# Patient Record
Sex: FEMALE | Race: WHITE | Employment: OTHER | URBAN - METROPOLITAN AREA
[De-identification: names, ages, dates, MRNs, and addresses within clinical notes are randomized per-mention and may not be internally consistent; named-entity substitution may affect disease eponyms.]

---

## 2017-03-15 ENCOUNTER — OP HISTORICAL/CONVERTED ENCOUNTER (OUTPATIENT)
Dept: OTHER | Age: 82
End: 2017-03-15

## 2017-08-05 ENCOUNTER — ED HISTORICAL/CONVERTED ENCOUNTER (OUTPATIENT)
Dept: OTHER | Age: 82
End: 2017-08-05

## 2017-09-27 ENCOUNTER — OP HISTORICAL/CONVERTED ENCOUNTER (OUTPATIENT)
Dept: OTHER | Age: 82
End: 2017-09-27

## 2017-12-06 ENCOUNTER — OP HISTORICAL/CONVERTED ENCOUNTER (OUTPATIENT)
Dept: OTHER | Age: 82
End: 2017-12-06

## 2018-09-12 ENCOUNTER — OP HISTORICAL/CONVERTED ENCOUNTER (OUTPATIENT)
Dept: OTHER | Age: 83
End: 2018-09-12

## 2019-02-15 ENCOUNTER — IP HISTORICAL/CONVERTED ENCOUNTER (OUTPATIENT)
Dept: OTHER | Age: 84
End: 2019-02-15

## 2019-03-09 ENCOUNTER — IP HISTORICAL/CONVERTED ENCOUNTER (OUTPATIENT)
Dept: OTHER | Age: 84
End: 2019-03-09

## 2019-04-17 ENCOUNTER — OP HISTORICAL/CONVERTED ENCOUNTER (OUTPATIENT)
Dept: OTHER | Age: 84
End: 2019-04-17

## 2019-07-17 ENCOUNTER — OP HISTORICAL/CONVERTED ENCOUNTER (OUTPATIENT)
Dept: OTHER | Age: 84
End: 2019-07-17

## 2019-11-13 ENCOUNTER — OP HISTORICAL/CONVERTED ENCOUNTER (OUTPATIENT)
Dept: OTHER | Age: 84
End: 2019-11-13

## 2020-01-30 ENCOUNTER — OP HISTORICAL/CONVERTED ENCOUNTER (OUTPATIENT)
Dept: OTHER | Age: 85
End: 2020-01-30

## 2020-04-15 ENCOUNTER — OP HISTORICAL/CONVERTED ENCOUNTER (OUTPATIENT)
Dept: OTHER | Age: 85
End: 2020-04-15

## 2020-08-17 ENCOUNTER — ED HISTORICAL/CONVERTED ENCOUNTER (OUTPATIENT)
Dept: OTHER | Age: 85
End: 2020-08-17

## 2020-12-07 ENCOUNTER — HOSPITAL ENCOUNTER (OUTPATIENT)
Dept: LAB | Age: 85
Discharge: HOME | End: 2020-12-07
Payer: MEDICARE

## 2020-12-07 PROCEDURE — 87086 URINE CULTURE/COLONY COUNT: CPT

## 2020-12-07 PROCEDURE — 81001 URINALYSIS AUTO W/SCOPE: CPT

## 2020-12-08 LAB
APPEARANCE UR: ABNORMAL
BACTERIA URNS QL MICRO: ABNORMAL /HPF
BILIRUB UR QL: NEGATIVE
COLOR UR: YELLOW
GLUCOSE UR STRIP.AUTO-MCNC: NEGATIVE MG/DL
HGB UR QL STRIP: NEGATIVE
KETONES UR QL STRIP.AUTO: NEGATIVE MG/DL
LEUKOCYTE ESTERASE UR QL STRIP.AUTO: ABNORMAL
NITRITE UR QL STRIP.AUTO: POSITIVE
PH UR STRIP: 5 [PH] (ref 5–8)
PROT UR STRIP-MCNC: NEGATIVE MG/DL
RBC #/AREA URNS HPF: ABNORMAL /HPF (ref 0–5)
SP GR UR REFRACTOMETRY: 1.01 (ref 1–1.03)
UROBILINOGEN UR QL STRIP.AUTO: 0.1 EU/DL (ref 0.1–1)
WBC URNS QL MICRO: >100 /HPF (ref 0–4)

## 2020-12-11 LAB
BACTERIA SPEC CULT: NORMAL
COLONY COUNT,CNT: NORMAL
SPECIAL REQUESTS,SREQ: NORMAL

## 2021-02-10 ENCOUNTER — HOSPITAL ENCOUNTER (OUTPATIENT)
Dept: LAB | Age: 86
Discharge: HOME | End: 2021-02-10
Payer: MEDICARE

## 2021-02-10 LAB
ALBUMIN SERPL-MCNC: 3.2 G/DL (ref 3.5–5)
ALBUMIN/GLOB SERPL: 0.9 {RATIO} (ref 1.1–2.2)
ALP SERPL-CCNC: 104 U/L (ref 45–117)
ALT SERPL-CCNC: 17 U/L (ref 12–78)
ANION GAP SERPL CALC-SCNC: 4 MMOL/L (ref 5–15)
AST SERPL W P-5'-P-CCNC: 19 U/L (ref 15–37)
BASOPHILS # BLD: 0 K/UL (ref 0–0.1)
BASOPHILS NFR BLD: 1 % (ref 0–1)
BILIRUB SERPL-MCNC: 0.7 MG/DL (ref 0.2–1)
BUN SERPL-MCNC: 8 MG/DL (ref 6–20)
BUN/CREAT SERPL: 9 (ref 12–20)
CA-I BLD-MCNC: 8.7 MG/DL (ref 8.5–10.1)
CHLORIDE SERPL-SCNC: 105 MMOL/L (ref 97–108)
CHOLEST SERPL-MCNC: 120 MG/DL
CO2 SERPL-SCNC: 30 MMOL/L (ref 21–32)
CREAT SERPL-MCNC: 0.93 MG/DL (ref 0.55–1.02)
DIFFERENTIAL METHOD BLD: ABNORMAL
EOSINOPHIL # BLD: 0.3 K/UL (ref 0–0.4)
EOSINOPHIL NFR BLD: 4 % (ref 0–7)
ERYTHROCYTE [DISTWIDTH] IN BLOOD BY AUTOMATED COUNT: 14.6 % (ref 11.5–14.5)
GLOBULIN SER CALC-MCNC: 3.4 G/DL (ref 2–4)
GLUCOSE SERPL-MCNC: 77 MG/DL (ref 65–100)
HCT VFR BLD AUTO: 37.4 % (ref 35–47)
HDLC SERPL-MCNC: 43 MG/DL
HDLC SERPL: 2.8 {RATIO} (ref 0–5)
HGB BLD-MCNC: 11.5 G/DL (ref 11.5–16)
IMM GRANULOCYTES # BLD AUTO: 0 K/UL (ref 0–0.04)
IMM GRANULOCYTES NFR BLD AUTO: 0 % (ref 0–0.5)
LDLC SERPL CALC-MCNC: 58.8 MG/DL (ref 0–100)
LIPID PROFILE,FLP: NORMAL
LYMPHOCYTES # BLD: 2.2 K/UL (ref 0.8–3.5)
LYMPHOCYTES NFR BLD: 29 % (ref 12–49)
MCH RBC QN AUTO: 30.9 PG (ref 26–34)
MCHC RBC AUTO-ENTMCNC: 30.7 G/DL (ref 30–36.5)
MCV RBC AUTO: 100.5 FL (ref 80–99)
MONOCYTES # BLD: 0.8 K/UL (ref 0–1)
MONOCYTES NFR BLD: 11 % (ref 5–13)
NEUTS SEG # BLD: 4.2 K/UL (ref 1.8–8)
NEUTS SEG NFR BLD: 55 % (ref 32–75)
PLATELET # BLD AUTO: 246 K/UL (ref 150–400)
PMV BLD AUTO: 9.7 FL (ref 8.9–12.9)
POTASSIUM SERPL-SCNC: 4.6 MMOL/L (ref 3.5–5.1)
PROT SERPL-MCNC: 6.6 G/DL (ref 6.4–8.2)
RBC # BLD AUTO: 3.72 M/UL (ref 3.8–5.2)
SODIUM SERPL-SCNC: 139 MMOL/L (ref 136–145)
TRIGL SERPL-MCNC: 91 MG/DL (ref ?–150)
VLDLC SERPL CALC-MCNC: 18.2 MG/DL
WBC # BLD AUTO: 7.5 K/UL (ref 3.6–11)

## 2021-02-10 PROCEDURE — 80053 COMPREHEN METABOLIC PANEL: CPT

## 2021-02-10 PROCEDURE — 80061 LIPID PANEL: CPT

## 2021-02-10 PROCEDURE — 85025 COMPLETE CBC W/AUTO DIFF WBC: CPT

## 2021-04-11 ENCOUNTER — HOSPITAL ENCOUNTER (INPATIENT)
Age: 86
LOS: 8 days | Discharge: HOME HEALTH | DRG: 478 | End: 2021-04-19
Attending: EMERGENCY MEDICINE | Admitting: INTERNAL MEDICINE
Payer: MEDICARE

## 2021-04-11 ENCOUNTER — APPOINTMENT (OUTPATIENT)
Dept: GENERAL RADIOLOGY | Age: 86
DRG: 478 | End: 2021-04-11
Attending: EMERGENCY MEDICINE
Payer: MEDICARE

## 2021-04-11 ENCOUNTER — APPOINTMENT (OUTPATIENT)
Dept: CT IMAGING | Age: 86
DRG: 478 | End: 2021-04-11
Attending: EMERGENCY MEDICINE
Payer: MEDICARE

## 2021-04-11 DIAGNOSIS — M43.9 COMPRESSION DEFORMITY OF VERTEBRA: Primary | ICD-10-CM

## 2021-04-11 PROBLEM — S22.000A COMPRESSION FRACTURE OF BODY OF THORACIC VERTEBRA (HCC): Status: ACTIVE | Noted: 2021-04-11

## 2021-04-11 LAB
COVID-19 RAPID TEST, COVR: NOT DETECTED
SPECIMEN SOURCE: NORMAL

## 2021-04-11 PROCEDURE — 71045 X-RAY EXAM CHEST 1 VIEW: CPT

## 2021-04-11 PROCEDURE — 87635 SARS-COV-2 COVID-19 AMP PRB: CPT

## 2021-04-11 PROCEDURE — 74011250636 HC RX REV CODE- 250/636: Performed by: INTERNAL MEDICINE

## 2021-04-11 PROCEDURE — 72131 CT LUMBAR SPINE W/O DYE: CPT

## 2021-04-11 PROCEDURE — 65270000032 HC RM SEMIPRIVATE

## 2021-04-11 PROCEDURE — 74011250637 HC RX REV CODE- 250/637: Performed by: EMERGENCY MEDICINE

## 2021-04-11 PROCEDURE — 99285 EMERGENCY DEPT VISIT HI MDM: CPT

## 2021-04-11 PROCEDURE — 74011250637 HC RX REV CODE- 250/637: Performed by: PHYSICIAN ASSISTANT

## 2021-04-11 PROCEDURE — 70450 CT HEAD/BRAIN W/O DYE: CPT

## 2021-04-11 PROCEDURE — 72128 CT CHEST SPINE W/O DYE: CPT

## 2021-04-11 RX ORDER — CHOLECALCIFEROL TAB 125 MCG (5000 UNIT) 125 MCG
5000 TAB ORAL DAILY
Status: DISCONTINUED | OUTPATIENT
Start: 2021-04-12 | End: 2021-04-19 | Stop reason: HOSPADM

## 2021-04-11 RX ORDER — CARVEDILOL 3.12 MG/1
3.12 TABLET ORAL 2 TIMES DAILY
Status: DISCONTINUED | OUTPATIENT
Start: 2021-04-11 | End: 2021-04-19 | Stop reason: HOSPADM

## 2021-04-11 RX ORDER — CARVEDILOL 3.12 MG/1
3.12 TABLET ORAL 2 TIMES DAILY
COMMUNITY

## 2021-04-11 RX ORDER — ONDANSETRON 4 MG/1
4 TABLET, ORALLY DISINTEGRATING ORAL
Status: DISCONTINUED | OUTPATIENT
Start: 2021-04-11 | End: 2021-04-19 | Stop reason: HOSPADM

## 2021-04-11 RX ORDER — FAMOTIDINE 20 MG/1
20 TABLET, FILM COATED ORAL 2 TIMES DAILY
Status: DISCONTINUED | OUTPATIENT
Start: 2021-04-11 | End: 2021-04-19 | Stop reason: HOSPADM

## 2021-04-11 RX ORDER — SPIRONOLACTONE 25 MG/1
25 TABLET ORAL DAILY
Status: DISCONTINUED | OUTPATIENT
Start: 2021-04-12 | End: 2021-04-19 | Stop reason: HOSPADM

## 2021-04-11 RX ORDER — ACETAMINOPHEN 325 MG/1
650 TABLET ORAL ONCE
Status: COMPLETED | OUTPATIENT
Start: 2021-04-11 | End: 2021-04-11

## 2021-04-11 RX ORDER — ASPIRIN 81 MG/1
81 TABLET ORAL DAILY
COMMUNITY

## 2021-04-11 RX ORDER — GABAPENTIN 100 MG/1
100 CAPSULE ORAL DAILY
COMMUNITY

## 2021-04-11 RX ORDER — ACETAMINOPHEN 650 MG/1
650 SUPPOSITORY RECTAL
Status: DISCONTINUED | OUTPATIENT
Start: 2021-04-11 | End: 2021-04-19 | Stop reason: HOSPADM

## 2021-04-11 RX ORDER — ATORVASTATIN CALCIUM 10 MG/1
10 TABLET, FILM COATED ORAL DAILY
Status: DISCONTINUED | OUTPATIENT
Start: 2021-04-12 | End: 2021-04-19 | Stop reason: HOSPADM

## 2021-04-11 RX ORDER — BUSPIRONE HYDROCHLORIDE 10 MG/1
10 TABLET ORAL 2 TIMES DAILY
COMMUNITY

## 2021-04-11 RX ORDER — CLOPIDOGREL BISULFATE 75 MG/1
75 TABLET ORAL DAILY
COMMUNITY

## 2021-04-11 RX ORDER — MIRTAZAPINE 7.5 MG/1
7.5 TABLET, FILM COATED ORAL
COMMUNITY

## 2021-04-11 RX ORDER — ASPIRIN 325 MG
325 TABLET ORAL
Status: DISCONTINUED | OUTPATIENT
Start: 2021-04-11 | End: 2021-04-11

## 2021-04-11 RX ORDER — SODIUM CHLORIDE 0.9 % (FLUSH) 0.9 %
5-40 SYRINGE (ML) INJECTION EVERY 8 HOURS
Status: DISCONTINUED | OUTPATIENT
Start: 2021-04-11 | End: 2021-04-17

## 2021-04-11 RX ORDER — ACETAMINOPHEN 325 MG/1
650 TABLET ORAL
COMMUNITY

## 2021-04-11 RX ORDER — AMOXICILLIN 250 MG
1 CAPSULE ORAL
Status: DISCONTINUED | OUTPATIENT
Start: 2021-04-11 | End: 2021-04-19 | Stop reason: HOSPADM

## 2021-04-11 RX ORDER — GABAPENTIN 100 MG/1
100 CAPSULE ORAL DAILY
Status: DISCONTINUED | OUTPATIENT
Start: 2021-04-12 | End: 2021-04-19 | Stop reason: HOSPADM

## 2021-04-11 RX ORDER — ONDANSETRON 2 MG/ML
4 INJECTION INTRAMUSCULAR; INTRAVENOUS
Status: DISCONTINUED | OUTPATIENT
Start: 2021-04-11 | End: 2021-04-19 | Stop reason: HOSPADM

## 2021-04-11 RX ORDER — POLYETHYLENE GLYCOL 3350 17 G/17G
17 POWDER, FOR SOLUTION ORAL DAILY PRN
Status: DISCONTINUED | OUTPATIENT
Start: 2021-04-11 | End: 2021-04-19 | Stop reason: HOSPADM

## 2021-04-11 RX ORDER — MORPHINE SULFATE 2 MG/ML
1 INJECTION, SOLUTION INTRAMUSCULAR; INTRAVENOUS
Status: DISPENSED | OUTPATIENT
Start: 2021-04-11 | End: 2021-04-13

## 2021-04-11 RX ORDER — SODIUM CHLORIDE 0.9 % (FLUSH) 0.9 %
5-40 SYRINGE (ML) INJECTION AS NEEDED
Status: DISCONTINUED | OUTPATIENT
Start: 2021-04-11 | End: 2021-04-19 | Stop reason: HOSPADM

## 2021-04-11 RX ORDER — ATORVASTATIN CALCIUM 10 MG/1
10 TABLET, FILM COATED ORAL DAILY
COMMUNITY

## 2021-04-11 RX ORDER — ACETAMINOPHEN 325 MG/1
650 TABLET ORAL
Status: DISCONTINUED | OUTPATIENT
Start: 2021-04-11 | End: 2021-04-19 | Stop reason: HOSPADM

## 2021-04-11 RX ORDER — BUSPIRONE HYDROCHLORIDE 10 MG/1
10 TABLET ORAL 2 TIMES DAILY
Status: DISCONTINUED | OUTPATIENT
Start: 2021-04-11 | End: 2021-04-19 | Stop reason: HOSPADM

## 2021-04-11 RX ORDER — TRAMADOL HYDROCHLORIDE 50 MG/1
50 TABLET ORAL
Status: DISCONTINUED | OUTPATIENT
Start: 2021-04-11 | End: 2021-04-19 | Stop reason: HOSPADM

## 2021-04-11 RX ORDER — POLYETHYLENE GLYCOL 3350 17 G/17G
17 POWDER, FOR SOLUTION ORAL
COMMUNITY

## 2021-04-11 RX ORDER — SPIRONOLACTONE 25 MG/1
25 TABLET ORAL DAILY
COMMUNITY

## 2021-04-11 RX ORDER — MIRTAZAPINE 15 MG/1
7.5 TABLET, FILM COATED ORAL
Status: DISCONTINUED | OUTPATIENT
Start: 2021-04-11 | End: 2021-04-19 | Stop reason: HOSPADM

## 2021-04-11 RX ORDER — CHOLECALCIFEROL TAB 125 MCG (5000 UNIT) 125 MCG
5000 TAB ORAL DAILY
COMMUNITY

## 2021-04-11 RX ADMIN — ACETAMINOPHEN 650 MG: 325 TABLET, FILM COATED ORAL at 09:33

## 2021-04-11 RX ADMIN — MIRTAZAPINE 7.5 MG: 15 TABLET, FILM COATED ORAL at 21:19

## 2021-04-11 RX ADMIN — MORPHINE SULFATE 1 MG: 2 INJECTION, SOLUTION INTRAMUSCULAR; INTRAVENOUS at 14:02

## 2021-04-11 RX ADMIN — ACETAMINOPHEN 650 MG: 325 TABLET, FILM COATED ORAL at 21:21

## 2021-04-11 RX ADMIN — BUSPIRONE HYDROCHLORIDE 10 MG: 10 TABLET ORAL at 21:19

## 2021-04-11 RX ADMIN — FAMOTIDINE 20 MG: 20 TABLET ORAL at 21:19

## 2021-04-11 RX ADMIN — Medication 10 ML: at 21:21

## 2021-04-11 RX ADMIN — CARVEDILOL 3.12 MG: 3.12 TABLET, FILM COATED ORAL at 21:19

## 2021-04-11 RX ADMIN — SENNOSIDES AND DOCUSATE SODIUM 1 TABLET: 8.6; 5 TABLET ORAL at 21:19

## 2021-04-11 RX ADMIN — Medication 10 ML: at 14:26

## 2021-04-11 NOTE — PROGRESS NOTES
Reason for Admission:                       RUR Score:                  PCP: First and Last name:   Lynda Garcia NP     Name of Practice:    Are you a current patient: Yes/No:    Approximate date of last visit:    Can you participate in a virtual visit if needed:     Do you (patient/family) have any concerns for transition/discharge?                    Plan for utilizing home health:       Current Advanced Directive/Advance Care Plan:  DNR      Healthcare Decision Maker:   Click here to complete [address] including selection of the Healthcare Decision Maker Relationship (ie \"Primary\")              Transition of Care Plan:        [facility]  Will follow ortho and thearapy evaluation/recommendation

## 2021-04-11 NOTE — ED PROVIDER NOTES
EMERGENCY DEPARTMENT HISTORY AND PHYSICAL EXAM        Date: 4/11/2021  Patient Name: Kezia Pacheco    History of Presenting Illness     Chief Complaint   Patient presents with    Back Pain     fractures of the thorco/lumbar spine compression fractures L4 and T12 from [facility]       History Provided By: Patient    HPI: Kezia Pacheco, 80 y.o. female with history of dementia who presents with vague pain in her left side. Unclear when this pain started however it is noted that patient has been less ambulatory and less active at her home. She is a poor historian due to dementia. No known falls. No other known injuries. Over the last few days she has been having decreased activity and complaining of pain on the left side. Pain is in the left rib cage and low back region. Unable to obtain any further history due to patient dementia. Unknown other associated symptoms. PCP: Yoav Prado NP        Past History     Past Medical History:  Dementia    Past Surgical History:  Reviewed and noncontributory    Family History:  History reviewed. No pertinent family history. Social History:  Social History     Tobacco Use    Smoking status: Unknown If Ever Smoked   Substance Use Topics    Alcohol use: Not Currently    Drug use: Not on file       Allergies: Allergies   Allergen Reactions    Iodine Unknown (comments)    Seafood Unable to Obtain     Review of Systems   Review of Systems   Constitutional: Negative for fever. HENT: Negative for congestion. Eyes: Negative for visual disturbance. Respiratory: Negative for shortness of breath. Cardiovascular: Negative for chest pain. Gastrointestinal: Negative for abdominal pain. Genitourinary: Negative for dysuria. Musculoskeletal: Positive for back pain. Negative for arthralgias. Skin: Negative for rash. Neurological: Negative for headaches. Physical Exam   Constitutional: No acute distress. Well-nourished. Skin: No rash.   ENT: No rhinorrhea. No cough. Head is normocephalic and atraumatic. Eye: No proptosis or conjunctival injections. Respiratory: No apparent respiratory distress. Gastrointestinal: Nondistended. Musculoskeletal: No obvious bony deformities. Tenderness to the left anterior rib cage. No tenderness to the lumbar spine or thoracic spine. Psychiatric: Cooperative. Appropriate mood and affect. Diagnostic Study Results     Labs -   No results found for this or any previous visit (from the past 24 hour(s)). Radiologic Studies -   CT HEAD WO CONT   Final Result   No acute abnormality demonstrated. Findings of cortical atrophy and   chronic small vessel disease are again noted. CT SPINE LUMB WO CONT   Final Result   1. Chronic compression fractures of L2 and L5.   2. Compression fractures of L3 and L4, age indeterminate, but new from 1. 3. Posterior vertebral body cortical buckling at the L4 level results in severe   spinal canal narrowing in conjunction with degenerative change at this level. 4. Diffuse osseous demineralization with sacral alar insufficiency fractures   difficult to exclude. 5. Multilevel neuroforaminal narrowing, as detailed above. 6. See above for additional comments. CT SPINE [facility] WO CONT   Final Result   1. Multiple compression fractures within the thoracic spine, as detailed above. The compression fractures at T4, T7, and T8 appear chronic. The remainder   age-indeterminate but new from 2015.   2. T8 and T12 compression fractures have buckling of the posterior vertebral   body cortex which results in up to at least mild to moderate spinal canal   narrowing. 3. Multilevel neural foraminal narrowing within the thoracic spine, detailed   above. 4. Approximately 4 mm incidental right upper lobe solid nodule. Based on risk   factors for pulmonary malignancy, consider repeat chest CT in 12 months.       XR CHEST SNGL V   Final Result   No findings of acute cardiopulmonary abnormality. MRI [address] SPINE WO CONT    (Results Pending)   MRI LUMB SPINE WO CONT    (Results Pending)     CT Results  (Last 48 hours)               04/11/21 0935  CT HEAD WO CONT Final result    Impression:  No acute abnormality demonstrated. Findings of cortical atrophy and   chronic small vessel disease are again noted. Narrative:  Dose Reduction:        All CT scans at this facility are performed using dose reduction optimization   techniques as appropriate to a performed exam including the following: Automated   exposure control, adjustments of the mA and/or kV according to patient size, or   use of iterative reconstruction technique. CT of the head without contrast. Axial images of the head were obtained   unenhanced and sagittal and coronal reconstructions were created. Comparison to   prior exam dated 03/08/2019. Previously noted changes of cortical atrophy are   again identified and white matter changes consistent with chronic small vessel   disease are again noted. Ventricles are normal in size shape and position. No   shift of the midline or mass is seen. No pathologic extra-axial fluid collection   is identified. There is no evidence of acute hemorrhage or infarction. No bony   abnormality is seen. The exam is otherwise unremarkable. 04/11/21 0935  CT SPINE LUMB WO CONT Final result    Impression:  1. Chronic compression fractures of L2 and L5.   2. Compression fractures of L3 and L4, age indeterminate, but new from 1. 3. Posterior vertebral body cortical buckling at the L4 level results in severe   spinal canal narrowing in conjunction with degenerative change at this level. 4. Diffuse osseous demineralization with sacral alar insufficiency fractures   difficult to exclude. 5. Multilevel neuroforaminal narrowing, as detailed above. 6. See above for additional comments.        Narrative:  Exam: CT SPINE LUMB WO CONT       TECHNIQUE: Multiple transaxial CT images of the lumbar spine were obtained   without contrast. Coronal and sagittal reformatted images were provided. Dose reduction: All CT scans at this facility are performed using dose reduction   optimization techniques as appropriate to a performed exam including the   following: Automated exposure control, adjustments of the mA and/or kV according   to patient size, or use of iterative reconstruction technique. COMPARISON: Lumbar spine radiographs 6/19/2015       HISTORY: Compression fracture       FINDINGS:       5 nonrib-bearing lumbar-type vertebral bodies are present. There are chronic   compression fractures of L2 and L5. The degree of L2 height loss appears mildly   progressed. There are additional compression fractures of L4 and L3 which are   age-indeterminate, but new from 2015. Compression fracture at L4 has posterior   vertebral body cortical buckling into the spinal canal which together with   degenerative change at this level results in severe spinal canal narrowing. There is pronounced diffuse osseous demineralization. Question irregular   appearance of the sacral ala, especially on the right, which is suspicious for   an additional sacral insufficiency fracture. Multilevel degenerative lumbar spondylosis that is most pronounced in the lower   lumbar spine. Neuroforaminal narrowing is most significant and up to at least   moderate at the L4-5 level. There is also moderate neuroforaminal narrowing on   the right at L3-4. Other neuroforaminal narrowing within the lumbar spine   appears less pronounced. There is extensive atherosclerotic disease of the abdominal aorta and its major   branches. Favored extrarenal pelvis on the right without caliectasis or   hydroureter evident. Nonobstructing stone within the left renal collecting   system measuring up to approximately 4 mm.           04/11/21 0935  CT SPINE [facility] WO CONT Final result    Impression:  1.  Multiple compression fractures within the thoracic spine, as detailed above. The compression fractures at T4, T7, and T8 appear chronic. The remainder   age-indeterminate but new from 2015.   2. T8 and T12 compression fractures have buckling of the posterior vertebral   body cortex which results in up to at least mild to moderate spinal canal   narrowing. 3. Multilevel neural foraminal narrowing within the thoracic spine, detailed   above. 4. Approximately 4 mm incidental right upper lobe solid nodule. Based on risk   factors for pulmonary malignancy, consider repeat chest CT in 12 months. Narrative:  Exam: CT SPINE THORAC WO CONT       TECHNIQUE: Multiple transaxial CT images of the thoracic spine were obtained   without contrast. Coronal and sagittal reformatted images were provided. Dose reduction: All CT scans at this facility are performed using dose reduction   optimization techniques as appropriate to a performed exam including the   following: Automated exposure control, adjustments of the mA and/or kV according   to patient size, or use of iterative reconstruction technique. COMPARISON: Thoracic spine radiographs 6/19/2015       HISTORY: compression fracture       FINDINGS:       Compression fractures of T4, T6, T7, T8, T9, and T12. Concave endplate   deformities at T10 and T11 which may represent additional compression fractures. The compression deformities at T4, T7, and T8 appear chronic. The remainder are   age indeterminate, but new from 1. The compression fractures at T8 and T12   have posterior vertebral body cortical buckling which results in at least mild   to moderate spinal canal narrowing at these levels. Likely remote posterior left   eighth rib fracture at the costovertebral junction. There is neuroforaminal   narrowing at multiple levels within the thoracic spine, most pronounced and   severe on the left at the T9-10 level as well as on the right at T8-9.  There are   other levels of significant neuroforaminal narrowing involving the T8-9 through   T10-11 levels. There is also significant neuroforaminal narrowing on the left at   the T12-L1 level. Extensive atherosclerosis of the thoracic aorta and coronary vasculature. Favored dependent atelectasis within both lungs. Respiratory motion otherwise   precludes detailed evaluation of the pulmonary parenchyma. Approximately 4 mm   right upper lobe solid nodule. Atherosclerotic calcifications within the upper   abdominal soft tissues. CXR Results  (Last 48 hours)               04/11/21 0925  XR CHEST SNGL V Final result    Impression:  No findings of acute cardiopulmonary abnormality. Narrative:  Examination: XR CHEST SNGL V        History: fall       Comparison: Chest radiograph 2/18/2019       FINDINGS:       Single frontal portable view of the chest. Symmetric lung volumes without focal   airspace consolidation, pneumothorax, or significant pleural effusion. Favored   punctate calcified granulomas within the right lung. The thoracic aorta is   tortuous and prominent with atherosclerotic calcification, similar to prior. The   cardiac silhouette is enlarged, also similar to prior. Fixation hardware of the   proximal left humerus. Osseous structures are overall unchanged. Medical Decision Making and ED Course     I reviewed the available vital signs, nursing notes, past medical history, past surgical history, family history, and social history. Vital Signs - Reviewed the patient's vital signs. Patient Vitals for the past 12 hrs:   Temp Pulse Resp BP SpO2   04/11/21 1331  66 20 104/69 98 %   04/11/21 1140  (!) 58 20 (!) 160/52 98 %   04/11/21 0855 97.9 °F (36.6 °C) 68 14 (!) 150/50 98 %         Medical Decision Making:   Presented with back pain. The differential diagnosis is back strain, rib fracture, lumbar fracture, thoracic fracture, compression fracture, strain.   CT shows multiple lumbar fractures that appear to be compression fractures. She is in pain especially with movement. She does have tenderness to the lower portion of her lumbar spine and sacral region on reassessment. Originally I did not localize pain in her back however now I am convinced she does have acute fracture. Case discussed with hospitalist who will admit. I also spoke with the orthopedic doctor who will consult. Disposition     Admitted      Diagnosis     Clinical impression:   1. Compression deformity of vertebra           Attestation:  Please note that this dictation was completed with [employer], the computer voice recognition software. Quite often unanticipated grammatical, syntax, homophones, and other interpretive errors are inadvertently transcribed by the computer software. Please disregard these errors. Please excuse any errors that have escaped final proofreading. Thank you.   Napoleon Kramer, DO

## 2021-04-11 NOTE — CONSULTS
Consult Date: 4/11/2021    IP CONSULT TO ORTHOPEDIC SURGERY  Consult performed by: John De León MD  Consult ordered by: Gayatri Garcia DO  Reason for consult: Multiple spine osteoporotic fractures  Assessment/Recommendations: Assessment  80year-old female who has been complaining of for back pain in the nursing home environment. Patient does have memory loss issues. Patient had stopped walking secondary to that and had x-rays done which showed some compression fractures. Patient came to the ER had CT scan of lumbar thoracic spine which showed old compression fractures with some fractures of indeterminate age. Patient's maximal tenderness is in the lower thoracic and upper lumbar spine. Lower extremities are neurologically intact. CT scan shows fractures to be stable with no evident involvement of spinal canal.  MRI of the lumbar and thoracic spine has been ordered to find the acuteness of the fractures. After that patient will be followed up by the spine team.    Plan:  MRI evaluation of the lumbar thoracic spine  Patient can weight-bear as tolerated with therapy. Osteoporosis work-up is rather late at age 80 but still some treatment can be done if the pain or recurrence of these fractures continues with help of a endocrinologist.  No acute orthopedic intervention is planned. Once MRI is done spine team will for their input and they can decide with the patient about options of kyphoplasty. Patient will be entered Dr. Yan murphy for Spine issues. Subjective   She is a 80 y.o. female who presented to the emergency room on 4/11/2021 from [address] for vague pain in her left side. Patient has a history of congestive heart failure, dementia, hypertension, anxiety, constipation. Patient is a poor historian due to the dementia. Most of the information is gathered through medical staff and chart. Per [address] patient has not had any recent falls or injuries.   However over the last few days she has had decreased activity and complaining of left-sided pain. She denies any chest pain, shortness of breath, cough. CT of the head showed no acute abnormality but cortical atrophy with chronic small vessel disease. CT of the lumbar spine showed chronic compression fractures of L2 and L5, compression fractures of L3 and L4 age-indeterminate. Posterior vertebral body cortical buckling at the L4 level with severe spinal canal narrowing. CT of the thoracic spine showed multiple compression fractures at T4, T7, T8 which appear chronic. Past Medical History:   Diagnosis Date    Dementia ([facility] [address]     Gastrointestinal disorder     constipation    Heart failure ([facility] [address]     Hypertension     Psychiatric disorder     anxiety      History reviewed. No pertinent surgical history. History reviewed. No pertinent family history.    Social History     Tobacco Use    Smoking status: Unknown If Ever Smoked   Substance Use Topics    Alcohol use: Not Currently       Current Facility-Administered Medications   Medication Dose Route Frequency Provider Last Rate Last Admin    [START ON 4/12/2021] atorvastatin (LIPITOR) tablet 10 mg  10 mg Oral DAILY Fauzia Taylor PA-C        busPIRone (BUSPAR) tablet 10 mg  10 mg Oral BID Fauzia Taylor PA-C        carvediloL (COREG) tablet 3.125 mg  3.125 mg Oral BID Rozina Moore PA-C        [START ON 4/12/2021] cholecalciferol (VITAMIN D3) (5000 Units/125 mcg) tablet 5,000 Units  5,000 Units Oral DAILY Fauzia Taylor PA-C        [START ON 4/12/2021] gabapentin (NEURONTIN) capsule 100 mg  100 mg Oral DAILY Fauzia Taylor PA-C        mirtazapine (REMERON) tablet 7.5 mg  7.5 mg Oral QHS Fauzia Taylor PA-C        senna-docusate (PERICOLACE) 8.6-50 mg per tablet 1 Tab  1 Tab Oral QHS Fauzia Taylor PA-C        [START ON 4/12/2021] spironolactone (ALDACTONE) tablet 25 mg  25 mg Oral DAILY Fauzia Taylor PA-C        sodium chloride (NS) flush 5-40 mL  5-40 mL IntraVENous Q8H Fauzia Taylor PA-C   10 mL at 04/11/21 1426    sodium chloride (NS) flush 5-40 mL  5-40 mL IntraVENous PRN Fauzia Taylor PA-C        acetaminophen (TYLENOL) tablet 650 mg  650 mg Oral Q6H PRN Fauzia Taylor PA-C        Or    acetaminophen (TYLENOL) suppository 650 mg  650 mg Rectal Q6H PRN Fauzia Taylor PA-C        ondansetron (ZOFRAN ODT) tablet 4 mg  4 mg Oral Q8H PRN aFuzia Taylor PA-C        Or    ondansetron (ZOFRAN) injection 4 mg  4 mg IntraVENous Q6H PRN Fauzia Taylor PA-C        famotidine (PEPCID) tablet 20 mg  20 mg Oral BID Fauzia Taylor PA-C        polyethylene glycol (MIRALAX) packet 17 g  17 g Oral DAILY PRN Fauzia Taylor PA-C        morphine injection 1 mg  1 mg IntraVENous Q4H PRN Carlos Nelson MD   1 mg at 04/11/21 1402    traMADoL (ULTRAM) tablet 50 mg  50 mg Oral Q6H PRN Bella Cuevas PA-C         Current Outpatient Medications   Medication Sig Dispense Refill    aspirin delayed-release 81 mg tablet Take 81 mg by mouth daily.  busPIRone (BUSPAR) 10 mg tablet Take 10 mg by mouth two (2) times a day.  carvediloL (COREG) 3.125 mg tablet Take 3.125 mg by mouth two (2) times a day.  clopidogreL (PLAVIX) 75 mg tab Take 75 mg by mouth daily.  gabapentin (NEURONTIN) 100 mg capsule Take 100 mg by mouth daily.  sennosides/docusate sodium (SENNA PLUS PO) Take 1 Tab by mouth two (2) times a day.  spironolactone (ALDACTONE) 25 mg tablet Take 25 mg by mouth daily.  cholecalciferol (VITAMIN D3) (5000 Units/125 mcg) tab tablet Take 5,000 Units by mouth daily.  atorvastatin (LIPITOR) 10 mg tablet Take 10 mg by mouth daily.  mirtazapine (REMERON) 7.5 mg tablet Take 7.5 mg by mouth nightly.  acetaminophen (TYLENOL) 325 mg tablet Take 650 mg by mouth every six (6) hours as needed for Pain.  polyethylene glycol (MIRALAX) 17 gram/dose powder Take 17 g by mouth daily as needed for Constipation. Indications: constipation          Review of Systems  Constitutional: No fevers, No chills, No fatigue, No weakness  Eyes: No visual disturbance  Ears, Nose, Mouth, Throat, and Face: No nasal congestion, No sore throat  Respiratory: No cough, No sputum, No wheezing, No SOB  Cardiovascular: No chest pain, No lower extremity edema, No Palpitations   Gastrointestinal: No nausea, No vomiting, No diarrhea, No constipation, No abdominal pain  Genitourinary: No frequency, No dysuria, No hematuria  Integument/Breast: No rash, No skin lesion(s), No dryness  Musculoskeletal: No arthralgias, No neck pain, No back pain  Neurological: No headaches, No dizziness, No confusion,  No seizures  Behavioral/Psychiatric: No anxiety, No depression  Objective     Vital signs for last 24 hours:  Visit Vitals  BP (!) 126/101   Pulse 83   Temp 97.9 °F (36.6 °C)   Resp 16   SpO2 96%       Intake/Output this shift:  Current Shift: No intake/output data recorded. Last 3 Shifts: No intake/output data recorded. Data Review:   Recent Results (from the past 24 hour(s))   COVID-19 RAPID TEST    Collection Time: 04/11/21  2:42 PM   Result Value Ref Range    Specimen source Nasopharyngeal      COVID-19 rapid test Not Detected Not Detected       CT Results  (Last 48 hours)               04/11/21 0935  CT HEAD WO CONT Final result    Impression:  No acute abnormality demonstrated. Findings of cortical atrophy and   chronic small vessel disease are again noted. Narrative:  Dose Reduction:        All CT scans at this facility are performed using dose reduction optimization   techniques as appropriate to a performed exam including the following: Automated   exposure control, adjustments of the mA and/or kV according to patient size, or   use of iterative reconstruction technique. CT of the head without contrast. Axial images of the head were obtained   unenhanced and sagittal and coronal reconstructions were created.  Comparison to   prior exam dated 03/08/2019. Previously noted changes of cortical atrophy are   again identified and white matter changes consistent with chronic small vessel   disease are again noted. Ventricles are normal in size shape and position. No   shift of the midline or mass is seen. No pathologic extra-axial fluid collection   is identified. There is no evidence of acute hemorrhage or infarction. No bony   abnormality is seen. The exam is otherwise unremarkable. 04/11/21 0935  CT SPINE LUMB WO CONT Final result    Impression:  1. Chronic compression fractures of L2 and L5.   2. Compression fractures of L3 and L4, age indeterminate, but new from 1. 3. Posterior vertebral body cortical buckling at the L4 level results in severe   spinal canal narrowing in conjunction with degenerative change at this level. 4. Diffuse osseous demineralization with sacral alar insufficiency fractures   difficult to exclude. 5. Multilevel neuroforaminal narrowing, as detailed above. 6. See above for additional comments. Narrative:  Exam: CT SPINE LUMB WO CONT       TECHNIQUE: Multiple transaxial CT images of the lumbar spine were obtained   without contrast. Coronal and sagittal reformatted images were provided. Dose reduction: All CT scans at this facility are performed using dose reduction   optimization techniques as appropriate to a performed exam including the   following: Automated exposure control, adjustments of the mA and/or kV according   to patient size, or use of iterative reconstruction technique. COMPARISON: Lumbar spine radiographs 6/19/2015       HISTORY: Compression fracture       FINDINGS:       5 nonrib-bearing lumbar-type vertebral bodies are present. There are chronic   compression fractures of L2 and L5. The degree of L2 height loss appears mildly   progressed. There are additional compression fractures of L4 and L3 which are   age-indeterminate, but new from 2015.  Compression fracture at L4 has posterior   vertebral body cortical buckling into the spinal canal which together with   degenerative change at this level results in severe spinal canal narrowing. There is pronounced diffuse osseous demineralization. Question irregular   appearance of the sacral ala, especially on the right, which is suspicious for   an additional sacral insufficiency fracture. Multilevel degenerative lumbar spondylosis that is most pronounced in the lower   lumbar spine. Neuroforaminal narrowing is most significant and up to at least   moderate at the L4-5 level. There is also moderate neuroforaminal narrowing on   the right at L3-4. Other neuroforaminal narrowing within the lumbar spine   appears less pronounced. There is extensive atherosclerotic disease of the abdominal aorta and its major   branches. Favored extrarenal pelvis on the right without caliectasis or   hydroureter evident. Nonobstructing stone within the left renal collecting   system measuring up to approximately 4 mm.           04/11/21 0935  CT SPINE [facility] WO CONT Final result    Impression:  1. Multiple compression fractures within the thoracic spine, as detailed above. The compression fractures at T4, T7, and T8 appear chronic. The remainder   age-indeterminate but new from 2015.   2. T8 and T12 compression fractures have buckling of the posterior vertebral   body cortex which results in up to at least mild to moderate spinal canal   narrowing. 3. Multilevel neural foraminal narrowing within the thoracic spine, detailed   above. 4. Approximately 4 mm incidental right upper lobe solid nodule. Based on risk   factors for pulmonary malignancy, consider repeat chest CT in 12 months. Narrative:  Exam: CT SPINE THORAC WO CONT       TECHNIQUE: Multiple transaxial CT images of the thoracic spine were obtained   without contrast. Coronal and sagittal reformatted images were provided.        Dose reduction: All CT scans at this facility are performed using dose reduction   optimization techniques as appropriate to a performed exam including the   following: Automated exposure control, adjustments of the mA and/or kV according   to patient size, or use of iterative reconstruction technique. COMPARISON: Thoracic spine radiographs 6/19/2015       HISTORY: compression fracture       FINDINGS:       Compression fractures of T4, T6, T7, T8, T9, and T12. Concave endplate   deformities at T10 and T11 which may represent additional compression fractures. The compression deformities at T4, T7, and T8 appear chronic. The remainder are   age indeterminate, but new from 1. The compression fractures at T8 and T12   have posterior vertebral body cortical buckling which results in at least mild   to moderate spinal canal narrowing at these levels. Likely remote posterior left   eighth rib fracture at the costovertebral junction. There is neuroforaminal   narrowing at multiple levels within the thoracic spine, most pronounced and   severe on the left at the T9-10 level as well as on the right at T8-9. There are   other levels of significant neuroforaminal narrowing involving the T8-9 through   T10-11 levels. There is also significant neuroforaminal narrowing on the left at   the T12-L1 level. Extensive atherosclerosis of the thoracic aorta and coronary vasculature. Favored dependent atelectasis within both lungs. Respiratory motion otherwise   precludes detailed evaluation of the pulmonary parenchyma. Approximately 4 mm   right upper lobe solid nodule. Atherosclerotic calcifications within the upper   abdominal soft tissues. CT scans were reviewed by orthopedic MD.  MRI of thoracic lumbar spine is pending for acuity of the fractures      Physical Exam  General: alert, cooperative, nodistress  Eye: conjunctivae/corneas clear. PERRL, EOM's intact. Throat and Neck: normal and no erythema or exudates noted.  No mass   Lung: Equal expansion bilaterally  Heart: regular rate and rhythm,   Abdomen: soft, non-tender, no distention extremities:  able to move all extremities normal, atraumatic  Spine: Thoracic kyphosis rounded is present. On patient turning to side maximal tenderness is in the lower thoracic upper lumbar spine. No obvious step-off or deformity palpated  Skin: Normal.  Neurologic: AOx3. and sensation grossly intact.   Psychiatric: non focal

## 2021-04-11 NOTE — ROUTINE PROCESS
TRANSFER - OUT REPORT: 
 
Verbal report given to ruth chase(name) on Carmen Batch  being transferred to [facility](unit) for routine progression of care Report consisted of patients Situation, Background, Assessment and  
Recommendations(SBAR). Information from the following report(s) SBAR, ED Summary, Intake/Output, MAR and Recent Results was reviewed with the receiving nurse. Lines:  
Peripheral IV 04/11/21 Right Antecubital (Active) Site Assessment Clean, dry, & intact 04/11/21 1404 Phlebitis Assessment 0 04/11/21 1404 Infiltration Assessment 0 04/11/21 1404 Dressing Status Clean, dry, & intact 04/11/21 1404 Dressing Type Transparent 04/11/21 4910 Hub Color/Line Status Pink;Flushed 04/11/21 1404 Opportunity for questions and clarification was provided. Patient transported with: 
 [facility]

## 2021-04-12 ENCOUNTER — APPOINTMENT (OUTPATIENT)
Dept: MRI IMAGING | Age: 86
DRG: 478 | End: 2021-04-12
Attending: ORTHOPAEDIC SURGERY
Payer: MEDICARE

## 2021-04-12 LAB
ANION GAP SERPL CALC-SCNC: 7 MMOL/L (ref 5–15)
BASOPHILS # BLD: 0 K/UL (ref 0–0.1)
BASOPHILS NFR BLD: 0 % (ref 0–1)
BUN SERPL-MCNC: 13 MG/DL (ref 6–20)
BUN/CREAT SERPL: 16 (ref 12–20)
CA-I BLD-MCNC: 9.2 MG/DL (ref 8.5–10.1)
CHLORIDE SERPL-SCNC: 103 MMOL/L (ref 97–108)
CO2 SERPL-SCNC: 26 MMOL/L (ref 21–32)
CREAT SERPL-MCNC: 0.81 MG/DL (ref 0.55–1.02)
DIFFERENTIAL METHOD BLD: NORMAL
EOSINOPHIL # BLD: 0.3 K/UL (ref 0–0.4)
EOSINOPHIL NFR BLD: 4 % (ref 0–7)
ERYTHROCYTE [DISTWIDTH] IN BLOOD BY AUTOMATED COUNT: 13.8 % (ref 11.5–14.5)
GLUCOSE SERPL-MCNC: 68 MG/DL (ref 65–100)
HCT VFR BLD AUTO: 38.9 % (ref 35–47)
HGB BLD-MCNC: 12.3 G/DL (ref 11.5–16)
IMM GRANULOCYTES # BLD AUTO: 0 K/UL (ref 0–0.04)
IMM GRANULOCYTES NFR BLD AUTO: 0 % (ref 0–0.5)
LYMPHOCYTES # BLD: 2.2 K/UL (ref 0.8–3.5)
LYMPHOCYTES NFR BLD: 30 % (ref 12–49)
MCH RBC QN AUTO: 30.9 PG (ref 26–34)
MCHC RBC AUTO-ENTMCNC: 31.6 G/DL (ref 30–36.5)
MCV RBC AUTO: 97.7 FL (ref 80–99)
MONOCYTES # BLD: 0.9 K/UL (ref 0–1)
MONOCYTES NFR BLD: 12 % (ref 5–13)
NEUTS SEG # BLD: 3.9 K/UL (ref 1.8–8)
NEUTS SEG NFR BLD: 54 % (ref 32–75)
PLATELET # BLD AUTO: 233 K/UL (ref 150–400)
PMV BLD AUTO: 9.4 FL (ref 8.9–12.9)
POTASSIUM SERPL-SCNC: 4 MMOL/L (ref 3.5–5.1)
RBC # BLD AUTO: 3.98 M/UL (ref 3.8–5.2)
SODIUM SERPL-SCNC: 136 MMOL/L (ref 136–145)
WBC # BLD AUTO: 7.2 K/UL (ref 3.6–11)

## 2021-04-12 PROCEDURE — 72148 MRI LUMBAR SPINE W/O DYE: CPT

## 2021-04-12 PROCEDURE — 85025 COMPLETE CBC W/AUTO DIFF WBC: CPT

## 2021-04-12 PROCEDURE — 36415 COLL VENOUS BLD VENIPUNCTURE: CPT

## 2021-04-12 PROCEDURE — 74011250637 HC RX REV CODE- 250/637: Performed by: PHYSICIAN ASSISTANT

## 2021-04-12 PROCEDURE — 72146 MRI CHEST SPINE W/O DYE: CPT

## 2021-04-12 PROCEDURE — 74011250636 HC RX REV CODE- 250/636: Performed by: PHYSICIAN ASSISTANT

## 2021-04-12 PROCEDURE — 80048 BASIC METABOLIC PNL TOTAL CA: CPT

## 2021-04-12 PROCEDURE — 65270000032 HC RM SEMIPRIVATE

## 2021-04-12 RX ADMIN — SODIUM CHLORIDE 500 ML: 9 INJECTION, SOLUTION INTRAVENOUS at 17:57

## 2021-04-12 RX ADMIN — BUSPIRONE HYDROCHLORIDE 10 MG: 10 TABLET ORAL at 10:59

## 2021-04-12 RX ADMIN — FAMOTIDINE 20 MG: 20 TABLET ORAL at 10:59

## 2021-04-12 RX ADMIN — CHOLECALCIFEROL TAB 125 MCG (5000 UNIT) 5000 UNITS: 125 TAB at 10:59

## 2021-04-12 RX ADMIN — GABAPENTIN 100 MG: 100 CAPSULE ORAL at 10:59

## 2021-04-12 RX ADMIN — ATORVASTATIN CALCIUM 10 MG: 10 TABLET, FILM COATED ORAL at 10:59

## 2021-04-12 RX ADMIN — Medication 10 ML: at 06:00

## 2021-04-12 NOTE — PROGRESS NOTES
Comprehensive Nutrition Assessment    Type and Reason for Visit: Initial(dysphagia)    Nutrition Recommendations/Plan:     Continue Ground/thin  Add Ensure Compact BID  Add Ensure pdg daily    SLP eval if bedside swallow eval failed    Nursing to document %meal and supplement intakes in I/Os  Obtain weekly measured wts    Nutrition Assessment:  Admitted for L sided pain. Ortho consulted, MRI spine results noted, possible plans for kyphoplasty. Ordered Pureed/thin diet on admit, now Ground/thin per MD. No SLP eval this admit, RD suspects reflective of home diet. RN relayed pt ate well at dinner last night, did not receive trays today in part d/t diet order issue and otherwise d/t testing. Currently off the unit, will f/u for intakes as able. Likely to benefit from supplementation to assist in meeting est needs. Labs unremarkable. Meds: Statin, coreg, pepcid, spironolactone, miralax, tramadol. Malnutrition Assessment:  Malnutrition Status:  No malnutrition    Context:  Acute illness     Findings of the 6 clinical characteristics of malnutrition:   Energy Intake:  No significant decrease in energy intake  Weight Loss:  No significant weight loss     Body Fat Loss:  Unable to assess,     Muscle Mass Loss:  Unable to assess,    Fluid Accumulation:  No significant fluid accumulation,        Nutrition Related Findings:  Unable to perform NFPE d/t pt unavailable. Hx of dysphagia suspected, unable to confirm. No n/v or c/d. No BM recorded. No edema.       Wounds:    None       Current Nutrition Therapies:  DIET DYSPHAGIA MECH ALTERED (NDD2)    Anthropometric Measures:  · Height:  5' 3\" (160 cm)  · Current Body Wt:  56.3 kg (124 lb 1.9 oz)(4/11)   · Usual Body Wt:  (VIGNESH)     · Ideal Body Wt:  115 lbs:  107.9 %   · BMI Category:  Normal weight (BMI 22.0-24.9) age over 72       Nutrition Diagnosis:   · Biting/chewing (masticatory) difficulty related to cognitive or neurological impairment, partial or complete edentulism as evidenced by (Ground/thin diet per SLP)      Nutrition Interventions:   Food and/or Nutrient Delivery: Continue current diet, Start oral nutrition supplement  Nutrition Education and Counseling: No recommendations at this time  Coordination of Nutrition Care: Swallow evaluation, Speech therapy, Continue to monitor while inpatient, Feeding assistance/environmental change    Goals:  Meet >65% est needs x 5-7 days, Wt maintenance +/- 1kg, Lytes WNL       Nutrition Monitoring and Evaluation:   Behavioral-Environmental Outcomes: None identified  Food/Nutrient Intake Outcomes: Food and nutrient intake, Diet advancement/tolerance  Physical Signs/Symptoms Outcomes: GI status, Weight, Chewing or swallowing, Meal time behavior, Biochemical data    Discharge Planning:     Too soon to determine     Electronically signed by Nadege Weaver on 4/12/2021 at 4:14 PM    Contact: [phone] or via [employer]

## 2021-04-12 NOTE — PROGRESS NOTES
Hospitalist Progress Note    Subjective:   Daily Progress Note: 4/12/2021 8:42 AM    Hospital Course: Patient is a 80 y.o. female who presented to the emergency room on 4/11/2021 from [address] for vague pain in her left side. Patient has a history of congestive heart failure, dementia, hypertension, anxiety, constipation. Patient is a poor historian due to the dementia. Per [address] patient has not had any recent falls or injuries. However over the last few days she has had decreased activity and complaining of left-sided pain. CT of the head showed no acute abnormality but cortical atrophy with chronic small vessel disease. CT of the lumbar spine showed chronic compression fractures of L2 and L5, compression fractures of L3 and L4 age-indeterminate. Posterior vertebral body cortical buckling at the L4 level with severe spinal canal narrowing. CT of the thoracic spine showed multiple compression fractures at T4, T7, T8 which appear chronic. Ortho and IR consulted. MRI pending. Per POA she has had a mulitple falls over the past few months. Subjective: Patient pulled out IV last night. No acute issues.      Current Facility-Administered Medications   Medication Dose Route Frequency    atorvastatin (LIPITOR) tablet 10 mg  10 mg Oral DAILY    busPIRone (BUSPAR) tablet 10 mg  10 mg Oral BID    carvediloL (COREG) tablet 3.125 mg  3.125 mg Oral BID    cholecalciferol (VITAMIN D3) (5000 Units/125 mcg) tablet 5,000 Units  5,000 Units Oral DAILY    gabapentin (NEURONTIN) capsule 100 mg  100 mg Oral DAILY    mirtazapine (REMERON) tablet 7.5 mg  7.5 mg Oral QHS    senna-docusate (PERICOLACE) 8.6-50 mg per tablet 1 Tab  1 Tab Oral QHS    spironolactone (ALDACTONE) tablet 25 mg  25 mg Oral DAILY    sodium chloride (NS) flush 5-40 mL  5-40 mL IntraVENous Q8H    sodium chloride (NS) flush 5-40 mL  5-40 mL IntraVENous PRN    acetaminophen (TYLENOL) tablet 650 mg  650 mg Oral Q6H PRN    Or    acetaminophen (TYLENOL) suppository 650 mg  650 mg Rectal Q6H PRN    ondansetron (ZOFRAN ODT) tablet 4 mg  4 mg Oral Q8H PRN    Or    ondansetron (ZOFRAN) injection 4 mg  4 mg IntraVENous Q6H PRN    famotidine (PEPCID) tablet 20 mg  20 mg Oral BID    polyethylene glycol (MIRALAX) packet 17 g  17 g Oral DAILY PRN    morphine injection 1 mg  1 mg IntraVENous Q4H PRN    traMADoL (ULTRAM) tablet 50 mg  50 mg Oral Q6H PRN        Review of Systems  Constitutional: No fevers, No chills, No sweats, ++ fatigue,++ Weakness  Eyes: No redness  Ears, nose, mouth, throat, and face: No nasal congestion, No sore throat, No voice change  Respiratory: No Shortness of Breath, No cough, No wheezing  Cardiovascular: No chest pain, No palpitations, No extremity edema  Gastrointestinal: No nausea, No vomiting, No diarrhea, No abdominal pain  Genitourinary: No frequency, No dysuria, No hematuria  Integument/breast: No skin lesion(s)   Neurological: ++Confusion, No headaches, No dizziness      Objective:     Visit Vitals  BP (!) 110/49   Pulse 79   Temp 98.2 °F (36.8 °C)   Resp 16   SpO2 95%   Breastfeeding No      O2 Device: None (Room air)    Temp (24hrs), Av °F (36.7 °C), Min:97.9 °F (36.6 °C), Max:98.2 °F (36.8 °C)      No intake/output data recorded. No intake/output data recorded. PHYSICAL EXAM:  Constitutional: elderly appearing, No acute distress  Skin: Extremities and face reveal no rashes. HEENT: Sclerae anicteric. Extra-occular muscles are intact. No oral ulcers. Cardiovascular: Regular rate and rhythm. Respiratory:  Clear breath sounds bilaterally with no wheezes, rales, or rhonchi. GI: Abdomen nondistended, soft, and nontender. Normal active bowel sounds. Musculoskeletal: No pitting edema of the lower legs. Able to move all ext  Neurological:  Patient is alert and oriented.  Cranial nerves II-XII grossly intact  Psychiatric: Mood appears appropriate       Data Review    Recent Results (from the past 24 hour(s))   COVID-19 RAPID TEST    Collection Time: 04/11/21  2:42 PM   Result Value Ref Range    Specimen source Nasopharyngeal      COVID-19 rapid test Not Detected Not Detected     CBC WITH AUTOMATED DIFF    Collection Time: 04/12/21  6:30 AM   Result Value Ref Range    WBC 7.2 3.6 - 11.0 K/uL    RBC 3.98 3.80 - 5.20 M/uL    HGB 12.3 11.5 - 16.0 g/dL    HCT 38.9 35.0 - 47.0 %    MCV 97.7 80.0 - 99.0 FL    MCH 30.9 26.0 - 34.0 PG    MCHC 31.6 30.0 - 36.5 g/dL    RDW 13.8 11.5 - 14.5 %    PLATELET 957 063 - 023 K/uL    MPV 9.4 8.9 - 12.9 FL    NEUTROPHILS 54 32 - 75 %    LYMPHOCYTES 30 12 - 49 %    MONOCYTES 12 5 - 13 %    EOSINOPHILS 4 0 - 7 %    BASOPHILS 0 0 - 1 %    IMMATURE GRANULOCYTES 0 0.0 - 0.5 %    ABS. NEUTROPHILS 3.9 1.8 - 8.0 K/UL    ABS. LYMPHOCYTES 2.2 0.8 - 3.5 K/UL    ABS. MONOCYTES 0.9 0.0 - 1.0 K/UL    ABS. EOSINOPHILS 0.3 0.0 - 0.4 K/UL    ABS. BASOPHILS 0.0 0.0 - 0.1 K/UL    ABS. IMM. GRANS. 0.0 0.00 - 0.04 K/UL    DF AUTOMATED         Radiology review: CT lumbar and thoracic spine    Assessment:   1. Multiple lumbar and thoracic compression fractures  2. Dementia without behavioral disturbances  3. History of CHF  4. Hypertension  5. Constipation  6. History of RA    Plan:    1. MRI of the spine pending. Interventional radiology and orthopedics consulted. Tylenol, tramadol, morphine as needed for pain. She normally takes aspirin and Plavix which we will hold at this time for possible kyphoplasty  2. Continue with Remeron  3. Blood pressure stable. Continue with Coreg. 4.  On stool softener  5. CBC BMP in a.m.  6.  Case management for discharge planning  7.   We will consult PT and OT after orthopedic and interventional radiology evaluation    ALISSA Kilgore - Cell [phone], home [phone] --> updated on 4/12/2021      CODE STATUS DNR     DVT prophylaxis: SCDs  Ulcer prophylaxis: Pepcid    Care Plan discussed with: Patient/Family, Nurse and     Total time spent with patient: 34 minutes.

## 2021-04-13 LAB
ANION GAP SERPL CALC-SCNC: 6 MMOL/L (ref 5–15)
BASOPHILS # BLD: 0 K/UL (ref 0–0.1)
BASOPHILS NFR BLD: 0 % (ref 0–1)
BUN SERPL-MCNC: 17 MG/DL (ref 6–20)
BUN/CREAT SERPL: 24 (ref 12–20)
CA-I BLD-MCNC: 8.9 MG/DL (ref 8.5–10.1)
CHLORIDE SERPL-SCNC: 102 MMOL/L (ref 97–108)
CO2 SERPL-SCNC: 26 MMOL/L (ref 21–32)
CREAT SERPL-MCNC: 0.71 MG/DL (ref 0.55–1.02)
DIFFERENTIAL METHOD BLD: ABNORMAL
EOSINOPHIL # BLD: 0.3 K/UL (ref 0–0.4)
EOSINOPHIL NFR BLD: 4 % (ref 0–7)
ERYTHROCYTE [DISTWIDTH] IN BLOOD BY AUTOMATED COUNT: 13.8 % (ref 11.5–14.5)
GLUCOSE SERPL-MCNC: 79 MG/DL (ref 65–100)
HCT VFR BLD AUTO: 35.3 % (ref 35–47)
HGB BLD-MCNC: 11.5 G/DL (ref 11.5–16)
IMM GRANULOCYTES # BLD AUTO: 0 K/UL (ref 0–0.04)
IMM GRANULOCYTES NFR BLD AUTO: 0 % (ref 0–0.5)
LYMPHOCYTES # BLD: 1.6 K/UL (ref 0.8–3.5)
LYMPHOCYTES NFR BLD: 24 % (ref 12–49)
MCH RBC QN AUTO: 31.6 PG (ref 26–34)
MCHC RBC AUTO-ENTMCNC: 32.6 G/DL (ref 30–36.5)
MCV RBC AUTO: 97 FL (ref 80–99)
MONOCYTES # BLD: 0.9 K/UL (ref 0–1)
MONOCYTES NFR BLD: 14 % (ref 5–13)
NEUTS SEG # BLD: 4 K/UL (ref 1.8–8)
NEUTS SEG NFR BLD: 58 % (ref 32–75)
PLATELET # BLD AUTO: 228 K/UL (ref 150–400)
PMV BLD AUTO: 9.6 FL (ref 8.9–12.9)
POTASSIUM SERPL-SCNC: 3.7 MMOL/L (ref 3.5–5.1)
RBC # BLD AUTO: 3.64 M/UL (ref 3.8–5.2)
SODIUM SERPL-SCNC: 134 MMOL/L (ref 136–145)
WBC # BLD AUTO: 6.8 K/UL (ref 3.6–11)

## 2021-04-13 PROCEDURE — 65270000032 HC RM SEMIPRIVATE

## 2021-04-13 PROCEDURE — 36415 COLL VENOUS BLD VENIPUNCTURE: CPT

## 2021-04-13 PROCEDURE — 74011250637 HC RX REV CODE- 250/637: Performed by: PHYSICIAN ASSISTANT

## 2021-04-13 PROCEDURE — 85025 COMPLETE CBC W/AUTO DIFF WBC: CPT

## 2021-04-13 PROCEDURE — 80048 BASIC METABOLIC PNL TOTAL CA: CPT

## 2021-04-13 RX ADMIN — TRAMADOL HYDROCHLORIDE 50 MG: 50 TABLET, FILM COATED ORAL at 04:04

## 2021-04-13 RX ADMIN — BUSPIRONE HYDROCHLORIDE 10 MG: 10 TABLET ORAL at 09:25

## 2021-04-13 RX ADMIN — ATORVASTATIN CALCIUM 10 MG: 10 TABLET, FILM COATED ORAL at 09:25

## 2021-04-13 RX ADMIN — GABAPENTIN 100 MG: 100 CAPSULE ORAL at 09:25

## 2021-04-13 RX ADMIN — BUSPIRONE HYDROCHLORIDE 10 MG: 10 TABLET ORAL at 21:13

## 2021-04-13 RX ADMIN — MIRTAZAPINE 7.5 MG: 15 TABLET, FILM COATED ORAL at 21:13

## 2021-04-13 RX ADMIN — TRAMADOL HYDROCHLORIDE 50 MG: 50 TABLET, FILM COATED ORAL at 16:08

## 2021-04-13 RX ADMIN — SENNOSIDES AND DOCUSATE SODIUM 1 TABLET: 8.6; 5 TABLET ORAL at 21:13

## 2021-04-13 RX ADMIN — Medication 10 ML: at 14:53

## 2021-04-13 RX ADMIN — Medication 10 ML: at 06:00

## 2021-04-13 RX ADMIN — SPIRONOLACTONE 25 MG: 25 TABLET ORAL at 09:25

## 2021-04-13 RX ADMIN — FAMOTIDINE 20 MG: 20 TABLET ORAL at 09:25

## 2021-04-13 RX ADMIN — CHOLECALCIFEROL TAB 125 MCG (5000 UNIT) 5000 UNITS: 125 TAB at 09:25

## 2021-04-13 RX ADMIN — CARVEDILOL 3.12 MG: 3.12 TABLET, FILM COATED ORAL at 21:13

## 2021-04-13 RX ADMIN — FAMOTIDINE 20 MG: 20 TABLET ORAL at 21:13

## 2021-04-13 NOTE — PROGRESS NOTES
Hospitalist Progress Note    Subjective:   Daily Progress Note: 4/13/2021 8:42 AM    Hospital Course: Patient is a 80 y.o. female who presented to the emergency room on 4/11/2021 from [address] for vague pain in her left side. Patient has a history of congestive heart failure, dementia, hypertension, anxiety, constipation. Patient is a poor historian due to the dementia. Per [address] patient has not had any recent falls or injuries. However over the last few days she has had decreased activity and complaining of left-sided pain. CT of the head showed no acute abnormality but cortical atrophy with chronic small vessel disease. CT of the lumbar spine showed chronic compression fractures of L2 and L5, compression fractures of L3 and L4 age-indeterminate. Posterior vertebral body cortical buckling at the L4 level with severe spinal canal narrowing. CT of the thoracic spine showed multiple compression fractures at T4, T7, T8 which appear chronic. Ortho and IR consulted. MRI pending. Per POA she has had a mulitple falls over the past few months. Subjective: Patient pulled out IV last night. No acute issues.      Current Facility-Administered Medications   Medication Dose Route Frequency    atorvastatin (LIPITOR) tablet 10 mg  10 mg Oral DAILY    busPIRone (BUSPAR) tablet 10 mg  10 mg Oral BID    [Held by provider] carvediloL (COREG) tablet 3.125 mg  3.125 mg Oral BID    cholecalciferol (VITAMIN D3) (5000 Units/125 mcg) tablet 5,000 Units  5,000 Units Oral DAILY    gabapentin (NEURONTIN) capsule 100 mg  100 mg Oral DAILY    mirtazapine (REMERON) tablet 7.5 mg  7.5 mg Oral QHS    senna-docusate (PERICOLACE) 8.6-50 mg per tablet 1 Tab  1 Tab Oral QHS    spironolactone (ALDACTONE) tablet 25 mg  25 mg Oral DAILY    sodium chloride (NS) flush 5-40 mL  5-40 mL IntraVENous Q8H    sodium chloride (NS) flush 5-40 mL  5-40 mL IntraVENous PRN    acetaminophen (TYLENOL) tablet 650 mg  650 mg Oral Q6H PRN    Or    acetaminophen (TYLENOL) suppository 650 mg  650 mg Rectal Q6H PRN    ondansetron (ZOFRAN ODT) tablet 4 mg  4 mg Oral Q8H PRN    Or    ondansetron (ZOFRAN) injection 4 mg  4 mg IntraVENous Q6H PRN    famotidine (PEPCID) tablet 20 mg  20 mg Oral BID    polyethylene glycol (MIRALAX) packet 17 g  17 g Oral DAILY PRN    morphine injection 1 mg  1 mg IntraVENous Q4H PRN    traMADoL (ULTRAM) tablet 50 mg  50 mg Oral Q6H PRN        Review of Systems  Constitutional: No fevers, No chills, No sweats, ++ fatigue,++ Weakness  Eyes: No redness  Ears, nose, mouth, throat, and face: No nasal congestion, No sore throat, No voice change  Respiratory: No Shortness of Breath, No cough, No wheezing  Cardiovascular: No chest pain, No palpitations, No extremity edema  Gastrointestinal: No nausea, No vomiting, No diarrhea, No abdominal pain  Genitourinary: No frequency, No dysuria, No hematuria  Integument/breast: No skin lesion(s)   Neurological: ++Confusion, No headaches, No dizziness      Objective:     Visit Vitals  /66   Pulse 78   Temp 97.5 °F (36.4 °C)   Resp 18   Ht 5' 3\" (1.6 m)   SpO2 100%   Breastfeeding No   BMI 22.00 kg/m²      O2 Device: None (Room air)    Temp (24hrs), Av.6 °F (36.4 °C), Min:97.4 °F (36.3 °C), Max:97.9 °F (36.6 °C)      No intake/output data recorded.  1901 -  0700  In: 120 [P.O.:120]  Out: -     PHYSICAL EXAM:  Constitutional: elderly appearing, No acute distress  Skin: Extremities and face reveal no rashes. HEENT: Sclerae anicteric. Extra-occular muscles are intact. No oral ulcers. Cardiovascular: Regular rate and rhythm. Respiratory:  Clear breath sounds bilaterally with no wheezes, rales, or rhonchi. GI: Abdomen nondistended, soft, and nontender. Normal active bowel sounds. Musculoskeletal: No pitting edema of the lower legs. Able to move all ext  Neurological:  Patient is alert and oriented.  Cranial nerves II-XII grossly intact  Psychiatric: Mood appears appropriate       Data Review    Recent Results (from the past 24 hour(s))   METABOLIC PANEL, BASIC    Collection Time: 04/13/21  5:54 AM   Result Value Ref Range    Sodium 134 (L) 136 - 145 mmol/L    Potassium 3.7 3.5 - 5.1 mmol/L    Chloride 102 97 - 108 mmol/L    CO2 26 21 - 32 mmol/L    Anion gap 6 5 - 15 mmol/L    Glucose 79 65 - 100 mg/dL    BUN 17 6 - 20 mg/dL    Creatinine 0.71 0.55 - 1.02 mg/dL    BUN/Creatinine ratio 24 (H) 12 - 20      GFR est AA >60 >60 ml/min/1.73m2    GFR est non-AA >60 >60 ml/min/1.73m2    Calcium 8.9 8.5 - 10.1 mg/dL   CBC WITH AUTOMATED DIFF    Collection Time: 04/13/21  5:54 AM   Result Value Ref Range    WBC 6.8 3.6 - 11.0 K/uL    RBC 3.64 (L) 3.80 - 5.20 M/uL    HGB 11.5 11.5 - 16.0 g/dL    HCT 35.3 35.0 - 47.0 %    MCV 97.0 80.0 - 99.0 FL    MCH 31.6 26.0 - 34.0 PG    MCHC 32.6 30.0 - 36.5 g/dL    RDW 13.8 11.5 - 14.5 %    PLATELET 336 995 - 264 K/uL    MPV 9.6 8.9 - 12.9 FL    NEUTROPHILS 58 32 - 75 %    LYMPHOCYTES 24 12 - 49 %    MONOCYTES 14 (H) 5 - 13 %    EOSINOPHILS 4 0 - 7 %    BASOPHILS 0 0 - 1 %    IMMATURE GRANULOCYTES 0 0.0 - 0.5 %    ABS. NEUTROPHILS 4.0 1.8 - 8.0 K/UL    ABS. LYMPHOCYTES 1.6 0.8 - 3.5 K/UL    ABS. MONOCYTES 0.9 0.0 - 1.0 K/UL    ABS. EOSINOPHILS 0.3 0.0 - 0.4 K/UL    ABS. BASOPHILS 0.0 0.0 - 0.1 K/UL    ABS. IMM. GRANS. 0.0 0.00 - 0.04 K/UL    DF AUTOMATED         Radiology review: CT lumbar and thoracic spine    Assessment:   1. Multiple lumbar and thoracic compression fractures  2. Dementia without behavioral disturbances  3. History of CHF  4. Hypertension  5. Constipation  6. History of RA    Plan:    1. MRI of the T- spine acute fracture with bone marrow edema. MRI of the lumbar spine shows L2-L5 compression fracture chronic. Posterior cortical buckling at the L4 fracture in conjunction with degenerative changes are present with severe spinal canal narrowing. Neuroforaminal narrowing is significant severe of the right L4-L5 region. MRI of the thoracic spine reveals a T10 inferior endplate acute compression fracture with bone marrow edema present. Mild edema consistent with subacute fracture of T6, T8 and T12. Moderate to severe spinal cord narrowing of T9 and T10. Interventional radiology and orthopedics consulted. Tylenol, tramadol, morphine as needed for pain. She normally takes aspirin and Plavix which we will hold at this time for possible kyphoplasty  2. Continue with Remeron  3. Blood pressure stable. Continue with Coreg. 4.  On stool softener  5. CBC BMP in a.m.  6.  Case management for discharge planning  7. We will consult PT and OT after orthopedic and interventional radiology evaluation    ALISSA Garcia - Cell [phone], home [phone] --> updated on 4/13/2021      CODE STATUS DNR     DVT prophylaxis: SCDs  Ulcer prophylaxis: Parmova 70 discussed with: Patient/Family, Nurse and     Total time spent with patient: 34 minutes.

## 2021-04-13 NOTE — CONSULTS
Vascular and Interventional Radiology Consult Note     NAME:  Sweta Dougherty   :  1927   MRN:  [id number]      Date:  2021    Consulting service:  Medicine  Consulting provider: Kenton Navarro    HPI:   HPI   Sweta Dougherty is a 80 y.o. female, w/ h/o dementia, who presented with lower back pain and left side rib cage pain from [facility] [address]. Patient has been less ambulatory and less active 2/2 the back pain. There is no known trauma. CT  TL spines showed multiple levels of vertebral compression fractures of various ages. Patient denies denies any N/V, F/C, diarrhea, SOA/CP, or new limb numbness/tingling.     ROS:  See HPI    Patient Vitals for the past 24 hrs:   Temp Pulse Resp BP SpO2   21 0736 97.5 °F (36.4 °C) 78 18 122/66 100 %   21 2239 97.9 °F (36.6 °C) 84 16 139/65 96 %   21 1921 97.7 °F (36.5 °C) 89 18 (!) 146/69 95 %         Recent Results (from the past 24 hour(s))   METABOLIC PANEL, BASIC    Collection Time: 21  5:54 AM   Result Value Ref Range    Sodium 134 (L) 136 - 145 mmol/L    Potassium 3.7 3.5 - 5.1 mmol/L    Chloride 102 97 - 108 mmol/L    CO2 26 21 - 32 mmol/L    Anion gap 6 5 - 15 mmol/L    Glucose 79 65 - 100 mg/dL    BUN 17 6 - 20 mg/dL    Creatinine 0.71 0.55 - 1.02 mg/dL    BUN/Creatinine ratio 24 (H) 12 - 20      GFR est AA >60 >60 ml/min/1.73m2    GFR est non-AA >60 >60 ml/min/1.73m2    Calcium 8.9 8.5 - 10.1 mg/dL   CBC WITH AUTOMATED DIFF    Collection Time: 21  5:54 AM   Result Value Ref Range    WBC 6.8 3.6 - 11.0 K/uL    RBC 3.64 (L) 3.80 - 5.20 M/uL    HGB 11.5 11.5 - 16.0 g/dL    HCT 35.3 35.0 - 47.0 %    MCV 97.0 80.0 - 99.0 FL    MCH 31.6 26.0 - 34.0 PG    MCHC 32.6 30.0 - 36.5 g/dL    RDW 13.8 11.5 - 14.5 %    PLATELET 497 385 - 310 K/uL    MPV 9.6 8.9 - 12.9 FL    NEUTROPHILS 58 32 - 75 %    LYMPHOCYTES 24 12 - 49 %    MONOCYTES 14 (H) 5 - 13 %    EOSINOPHILS 4 0 - 7 %    BASOPHILS 0 0 - 1 %    IMMATURE GRANULOCYTES 0 0.0 - 0.5 % ABS. NEUTROPHILS 4.0 1.8 - 8.0 K/UL    ABS. LYMPHOCYTES 1.6 0.8 - 3.5 K/UL    ABS. MONOCYTES 0.9 0.0 - 1.0 K/UL    ABS. EOSINOPHILS 0.3 0.0 - 0.4 K/UL    ABS. BASOPHILS 0.0 0.0 - 0.1 K/UL    ABS. IMM. GRANS. 0.0 0.00 - 0.04 K/UL    DF AUTOMATED           Imaging:  Xr Chest Sngl V    Result Date: 4/11/2021  No findings of acute cardiopulmonary abnormality. Mri Thorac Spine Wo Cont    Result Date: 4/12/2021  1. Acute appearing compression fracture involving the T10 inferior endplate. 2. Compression fractures at T6, T8, and T12 with residual mild marrow edema, but do not appear acute given the amount of marrow edema. 3. Multilevel degenerative thoracic spondylosis, as detailed above. Spinal canal narrowing appears most significant and moderate to severe T9-10 level. Mri Lumb Spine Wo Cont    Result Date: 4/12/2021  1. L2-L5 compression fractures appear chronic. 2. Posterior cortical buckling at the L4 fracture in conjunction with degenerative changes at this level results in severe spinal canal narrowing. 3. Neuroforaminal narrowing is most significant and severe on the right at L4-5. 4. See above for additional comments. Ct Head Wo Cont    Result Date: 4/11/2021  No acute abnormality demonstrated. Findings of cortical atrophy and chronic small vessel disease are again noted. Ct Spine Thorac Wo Cont    Result Date: 4/11/2021  1. Multiple compression fractures within the thoracic spine, as detailed above. The compression fractures at T4, T7, and T8 appear chronic. The remainder age-indeterminate but new from 2015. 2. T8 and T12 compression fractures have buckling of the posterior vertebral body cortex which results in up to at least mild to moderate spinal canal narrowing. 3. Multilevel neural foraminal narrowing within the thoracic spine, detailed above. 4. Approximately 4 mm incidental right upper lobe solid nodule.  Based on risk factors for pulmonary malignancy, consider repeat chest CT in 12 months. Ct Spine Lumb Wo Cont    Result Date: 4/11/2021  1. Chronic compression fractures of L2 and L5. 2. Compression fractures of L3 and L4, age indeterminate, but new from 1. 3. Posterior vertebral body cortical buckling at the L4 level results in severe spinal canal narrowing in conjunction with degenerative change at this level. 4. Diffuse osseous demineralization with sacral alar insufficiency fractures difficult to exclude. 5. Multilevel neuroforaminal narrowing, as detailed above. 6. See above for additional comments. Physical Exam:  Skin:  Extremities and face reveal no rashes. HEENT: Sclerae anicteric. Extra-occular muscles are intact. Cardiovascular: Regular rate and rhythm. Respiratory:  Comfortable breathing with no accessory muscle use. GI:  Abdomen nondistended, soft, and nontender.     Neurological:  Patient is awake and alert  BLE: palpable b/l femoral and DP pulses, sensory and motor intact    Assessment/Plan:  Kezia Pacheco is a 80 y.o. female, w/ h/o demantia, who presented with back pain     - MR T-spine showed marked edema in T10 with inferior endplate fracture  - Patient was ambulatory before the hospitalization    - Will perform kyphoplasty to help improve her ambulation  --- Risk and benefit and alternative treatment was thoroughly explained to the patient's POA Govind Marrero ([phone])  --- She expressed understanding and decided to proceed with the procedure    - Labs all reviewed, appropriate  - Plavix being held   - Given patient's status, will need MAC by anesthesia    - Thanks for involving [facility] in patient's care     Jamal Carr MD PhD

## 2021-04-13 NOTE — PROGRESS NOTES
OT eval order received and acknowledged. OT eval attempted at 1020 however Ortho PA advised therapy to hold until cleared by orthopedic. Will continue to follow patient and attempt OT eval at a later time. Thank you.

## 2021-04-13 NOTE — PROGRESS NOTES
PT eval order received and acknowledged. PT eval attempted at 1015 however per ortho PA was advised to hold until cleared by orthopedic. Will continue to follow patient and attempt PT eval at a later time. Thank you.

## 2021-04-14 ENCOUNTER — APPOINTMENT (OUTPATIENT)
Dept: INTERVENTIONAL RADIOLOGY/VASCULAR | Age: 86
DRG: 478 | End: 2021-04-14
Attending: PHYSICIAN ASSISTANT
Payer: MEDICARE

## 2021-04-14 ENCOUNTER — ANESTHESIA EVENT (OUTPATIENT)
Dept: INTERVENTIONAL RADIOLOGY/VASCULAR | Age: 86
DRG: 478 | End: 2021-04-14
Payer: MEDICARE

## 2021-04-14 ENCOUNTER — ANESTHESIA (OUTPATIENT)
Dept: INTERVENTIONAL RADIOLOGY/VASCULAR | Age: 86
DRG: 478 | End: 2021-04-14
Payer: MEDICARE

## 2021-04-14 PROCEDURE — 77030021783 HC SYS CEM DEL MEDT -D

## 2021-04-14 PROCEDURE — 74011250636 HC RX REV CODE- 250/636: Performed by: RADIOLOGY

## 2021-04-14 PROCEDURE — 74011250636 HC RX REV CODE- 250/636: Performed by: NURSE ANESTHETIST, CERTIFIED REGISTERED

## 2021-04-14 PROCEDURE — 88307 TISSUE EXAM BY PATHOLOGIST: CPT

## 2021-04-14 PROCEDURE — 88311 DECALCIFY TISSUE: CPT

## 2021-04-14 PROCEDURE — 77030003451 HC NDL BIOP BN MEDT -C

## 2021-04-14 PROCEDURE — 0PU43JZ SUPPLEMENT THORACIC VERTEBRA WITH SYNTHETIC SUBSTITUTE, PERCUTANEOUS APPROACH: ICD-10-PCS | Performed by: RADIOLOGY

## 2021-04-14 PROCEDURE — 65270000032 HC RM SEMIPRIVATE

## 2021-04-14 PROCEDURE — 0PS43ZZ REPOSITION THORACIC VERTEBRA, PERCUTANEOUS APPROACH: ICD-10-PCS | Performed by: RADIOLOGY

## 2021-04-14 PROCEDURE — 0PB43ZX EXCISION OF THORACIC VERTEBRA, PERCUTANEOUS APPROACH, DIAGNOSTIC: ICD-10-PCS | Performed by: RADIOLOGY

## 2021-04-14 PROCEDURE — 74011000250 HC RX REV CODE- 250: Performed by: NURSE ANESTHETIST, CERTIFIED REGISTERED

## 2021-04-14 PROCEDURE — 22513 PERQ VERTEBRAL AUGMENTATION: CPT

## 2021-04-14 PROCEDURE — 88305 TISSUE EXAM BY PATHOLOGIST: CPT

## 2021-04-14 PROCEDURE — 74011250637 HC RX REV CODE- 250/637: Performed by: PHYSICIAN ASSISTANT

## 2021-04-14 PROCEDURE — 77030041313 HC TY KYPH FRST FX MEDT -K1

## 2021-04-14 PROCEDURE — C1713 ANCHOR/SCREW BN/BN,TIS/BN: HCPCS

## 2021-04-14 PROCEDURE — 74011000258 HC RX REV CODE- 258: Performed by: NURSE ANESTHETIST, CERTIFIED REGISTERED

## 2021-04-14 RX ORDER — PROPOFOL 10 MG/ML
INJECTION, EMULSION INTRAVENOUS
Status: DISCONTINUED | OUTPATIENT
Start: 2021-04-14 | End: 2021-04-14 | Stop reason: HOSPADM

## 2021-04-14 RX ORDER — LIDOCAINE HYDROCHLORIDE 20 MG/ML
INJECTION, SOLUTION EPIDURAL; INFILTRATION; INTRACAUDAL; PERINEURAL AS NEEDED
Status: DISCONTINUED | OUTPATIENT
Start: 2021-04-14 | End: 2021-04-14 | Stop reason: HOSPADM

## 2021-04-14 RX ORDER — CEFAZOLIN SODIUM 1 G/3ML
2 INJECTION, POWDER, FOR SOLUTION INTRAMUSCULAR; INTRAVENOUS ONCE
Status: COMPLETED | OUTPATIENT
Start: 2021-04-14 | End: 2021-04-14

## 2021-04-14 RX ORDER — KETAMINE HYDROCHLORIDE 10 MG/ML
INJECTION, SOLUTION INTRAMUSCULAR; INTRAVENOUS AS NEEDED
Status: DISCONTINUED | OUTPATIENT
Start: 2021-04-14 | End: 2021-04-14 | Stop reason: HOSPADM

## 2021-04-14 RX ORDER — FENTANYL CITRATE 50 UG/ML
INJECTION, SOLUTION INTRAMUSCULAR; INTRAVENOUS AS NEEDED
Status: DISCONTINUED | OUTPATIENT
Start: 2021-04-14 | End: 2021-04-14 | Stop reason: HOSPADM

## 2021-04-14 RX ORDER — GLYCOPYRROLATE 0.2 MG/ML
INJECTION INTRAMUSCULAR; INTRAVENOUS AS NEEDED
Status: DISCONTINUED | OUTPATIENT
Start: 2021-04-14 | End: 2021-04-14 | Stop reason: HOSPADM

## 2021-04-14 RX ORDER — KETOROLAC TROMETHAMINE 15 MG/ML
15 INJECTION, SOLUTION INTRAMUSCULAR; INTRAVENOUS ONCE
Status: COMPLETED | OUTPATIENT
Start: 2021-04-14 | End: 2021-04-14

## 2021-04-14 RX ORDER — SODIUM CHLORIDE 9 MG/ML
INJECTION, SOLUTION INTRAVENOUS
Status: DISCONTINUED | OUTPATIENT
Start: 2021-04-14 | End: 2021-04-14 | Stop reason: HOSPADM

## 2021-04-14 RX ADMIN — TRAMADOL HYDROCHLORIDE 50 MG: 50 TABLET, FILM COATED ORAL at 09:00

## 2021-04-14 RX ADMIN — KETAMINE HYDROCHLORIDE 20 MG: 10 INJECTION INTRAMUSCULAR; INTRAVENOUS at 14:23

## 2021-04-14 RX ADMIN — KETAMINE HYDROCHLORIDE 20 MG: 10 INJECTION INTRAMUSCULAR; INTRAVENOUS at 15:15

## 2021-04-14 RX ADMIN — ATORVASTATIN CALCIUM 10 MG: 10 TABLET, FILM COATED ORAL at 08:48

## 2021-04-14 RX ADMIN — KETOROLAC TROMETHAMINE 15 MG: 15 INJECTION, SOLUTION INTRAMUSCULAR; INTRAVENOUS at 14:15

## 2021-04-14 RX ADMIN — FENTANYL CITRATE 50 MCG: 50 INJECTION, SOLUTION INTRAMUSCULAR; INTRAVENOUS at 14:53

## 2021-04-14 RX ADMIN — GLYCOPYRROLATE 0.1 MG: 0.2 INJECTION INTRAMUSCULAR; INTRAVENOUS at 14:23

## 2021-04-14 RX ADMIN — CEFAZOLIN SODIUM 2 G: 1 INJECTION, POWDER, FOR SOLUTION INTRAMUSCULAR; INTRAVENOUS at 14:19

## 2021-04-14 RX ADMIN — PHENYLEPHRINE HYDROCHLORIDE 40 MCG/MIN: 10 INJECTION INTRAVENOUS at 14:17

## 2021-04-14 RX ADMIN — PROPOFOL 25 MCG/KG/MIN: 10 INJECTION, EMULSION INTRAVENOUS at 14:17

## 2021-04-14 RX ADMIN — PHENYLEPHRINE HYDROCHLORIDE 100 MCG: 10 INJECTION INTRAVENOUS at 14:54

## 2021-04-14 RX ADMIN — CARVEDILOL 3.12 MG: 3.12 TABLET, FILM COATED ORAL at 08:48

## 2021-04-14 RX ADMIN — BUSPIRONE HYDROCHLORIDE 10 MG: 10 TABLET ORAL at 21:11

## 2021-04-14 RX ADMIN — SENNOSIDES AND DOCUSATE SODIUM 1 TABLET: 8.6; 5 TABLET ORAL at 21:11

## 2021-04-14 RX ADMIN — SPIRONOLACTONE 25 MG: 25 TABLET ORAL at 08:48

## 2021-04-14 RX ADMIN — FAMOTIDINE 20 MG: 20 TABLET ORAL at 21:11

## 2021-04-14 RX ADMIN — CHOLECALCIFEROL TAB 125 MCG (5000 UNIT) 5000 UNITS: 125 TAB at 08:48

## 2021-04-14 RX ADMIN — BUSPIRONE HYDROCHLORIDE 10 MG: 10 TABLET ORAL at 08:48

## 2021-04-14 RX ADMIN — FAMOTIDINE 20 MG: 20 TABLET ORAL at 08:48

## 2021-04-14 RX ADMIN — SODIUM CHLORIDE: 9 INJECTION, SOLUTION INTRAVENOUS at 14:09

## 2021-04-14 RX ADMIN — MIRTAZAPINE 7.5 MG: 15 TABLET, FILM COATED ORAL at 21:11

## 2021-04-14 RX ADMIN — LIDOCAINE HYDROCHLORIDE 50 MG: 20 INJECTION, SOLUTION EPIDURAL; INFILTRATION; INTRACAUDAL; PERINEURAL at 14:12

## 2021-04-14 RX ADMIN — CARVEDILOL 3.12 MG: 3.12 TABLET, FILM COATED ORAL at 21:11

## 2021-04-14 RX ADMIN — FENTANYL CITRATE 25 MCG: 50 INJECTION, SOLUTION INTRAMUSCULAR; INTRAVENOUS at 14:42

## 2021-04-14 RX ADMIN — GABAPENTIN 100 MG: 100 CAPSULE ORAL at 08:48

## 2021-04-14 RX ADMIN — KETAMINE HYDROCHLORIDE 10 MG: 10 INJECTION INTRAMUSCULAR; INTRAVENOUS at 14:46

## 2021-04-14 RX ADMIN — FENTANYL CITRATE 25 MCG: 50 INJECTION, SOLUTION INTRAMUSCULAR; INTRAVENOUS at 14:09

## 2021-04-14 NOTE — ANESTHESIA PREPROCEDURE EVALUATION
Relevant Problems   No relevant active problems       Anesthetic History   No history of anesthetic complications            Review of Systems / Medical History  Patient summary reviewed, nursing notes reviewed and pertinent labs reviewed    Pulmonary  Within defined limits                 Neuro/Psych         Psychiatric history and dementia     Cardiovascular    Hypertension      CHF    Hyperlipidemia         GI/Hepatic/Renal     GERD           Endo/Other  Within defined limits           Other Findings            Past Medical History:   Diagnosis Date    Dementia ([facility] [address]     Gastrointestinal disorder     constipation    Heart failure ([facility]     Hypertension     Psychiatric disorder     anxiety       History reviewed. No pertinent surgical history.     Current Outpatient Medications   Medication Instructions    acetaminophen (TYLENOL) 650 mg, Oral, EVERY 6 HOURS AS NEEDED    aspirin delayed-release 81 mg, Oral, DAILY    atorvastatin (LIPITOR) 10 mg, Oral, DAILY    busPIRone (BUSPAR) 10 mg, Oral, 2 TIMES DAILY    carvediloL (COREG) 3.125 mg, Oral, 2 TIMES DAILY    cholecalciferol (VITAMIN D3) 5,000 Units, Oral, DAILY    clopidogreL (PLAVIX) 75 mg, Oral, DAILY    gabapentin (NEURONTIN) 100 mg, Oral, DAILY    mirtazapine (REMERON) 7.5 mg, Oral, EVERY BEDTIME    polyethylene glycol (MIRALAX) 17 g, Oral, DAILY AS NEEDED    sennosides/docusate sodium (SENNA PLUS PO) 1 Tab, Oral, 2 TIMES DAILY    spironolactone (ALDACTONE) 25 mg, Oral, DAILY       Current Facility-Administered Medications   Medication Dose Route Frequency    atorvastatin (LIPITOR) tablet 10 mg  10 mg Oral DAILY    busPIRone (BUSPAR) tablet 10 mg  10 mg Oral BID    carvediloL (COREG) tablet 3.125 mg  3.125 mg Oral BID    cholecalciferol (VITAMIN D3) (5000 Units/125 mcg) tablet 5,000 Units  5,000 Units Oral DAILY    gabapentin (NEURONTIN) capsule 100 mg  100 mg Oral DAILY    mirtazapine (REMERON) tablet 7.5 mg  7.5 mg Oral QHS    senna-docusate (PERICOLACE) 8.6-50 mg per tablet 1 Tab  1 Tab Oral QHS    spironolactone (ALDACTONE) tablet 25 mg  25 mg Oral DAILY    sodium chloride (NS) flush 5-40 mL  5-40 mL IntraVENous Q8H    sodium chloride (NS) flush 5-40 mL  5-40 mL IntraVENous PRN    acetaminophen (TYLENOL) tablet 650 mg  650 mg Oral Q6H PRN    Or    acetaminophen (TYLENOL) suppository 650 mg  650 mg Rectal Q6H PRN    ondansetron (ZOFRAN ODT) tablet 4 mg  4 mg Oral Q8H PRN    Or    ondansetron (ZOFRAN) injection 4 mg  4 mg IntraVENous Q6H PRN    famotidine (PEPCID) tablet 20 mg  20 mg Oral BID    polyethylene glycol (MIRALAX) packet 17 g  17 g Oral DAILY PRN    traMADoL (ULTRAM) tablet 50 mg  50 mg Oral Q6H PRN       Patient Vitals for the past 24 hrs:   Temp Pulse Resp BP SpO2   04/14/21 0820 36.9 °C (98.5 °F) 94 18 116/64 93 %   04/13/21 2101 36.4 °C (97.5 °F) 91 18 106/68 93 %   04/13/21 1655 36.7 °C (98 °F) 88 18 (!) 104/56 94 %       Lab Results   Component Value Date/Time    WBC 6.8 04/13/2021 05:54 AM    HGB 11.5 04/13/2021 05:54 AM    HCT 35.3 04/13/2021 05:54 AM    PLATELET 342 61/73/5671 05:54 AM    MCV 97.0 04/13/2021 05:54 AM     Lab Results   Component Value Date/Time    Sodium 134 (L) 04/13/2021 05:54 AM    Potassium 3.7 04/13/2021 05:54 AM    Chloride 102 04/13/2021 05:54 AM    CO2 26 04/13/2021 05:54 AM    Anion gap 6 04/13/2021 05:54 AM    Glucose 79 04/13/2021 05:54 AM    BUN 17 04/13/2021 05:54 AM    Creatinine 0.71 04/13/2021 05:54 AM    BUN/Creatinine ratio 24 (H) 04/13/2021 05:54 AM    GFR est AA >60 04/13/2021 05:54 AM    GFR est non-AA >60 04/13/2021 05:54 AM    Calcium 8.9 04/13/2021 05:54 AM     No results found for: APTT, PTP, INR, INREXT  Lab Results   Component Value Date/Time    Glucose 79 04/13/2021 05:54 AM         Physical Exam    Airway  Mallampati: II  TM Distance: 4 - 6 cm  Neck ROM: normal range of motion   Mouth opening: Normal     Cardiovascular    Rhythm: regular  Rate: normal Dental    Dentition: Edentulous     Pulmonary  Breath sounds clear to auscultation               Abdominal  GI exam deferred       Other Findings            Anesthetic Plan    ASA: 3  Anesthesia type: general          Induction: Intravenous  Anesthetic plan and risks discussed with: Patient and Family

## 2021-04-14 NOTE — PROGRESS NOTES
Two person weekly skin assessment completed with Rey. Patient has reddened outline that circles above right buttocks around the lateral side of right buttocks and just below the right buttock on the back of the upper thigh. No other pressure injuries observed. Patient has two band-aids in the middle of her back covering two small incisions from a kyphoplasty procedure today.

## 2021-04-14 NOTE — PROGRESS NOTES
PT eval order received and acknowledged. PT eval attempted at 113pm however pt off the floor in IR, also still awaiting ortho clearance to mobilize pt due to multiple spinal compression fractures. Will continue to follow patient and attempt PT eval at a later time. Thank you.

## 2021-04-14 NOTE — PROGRESS NOTES
OT eval order received and acknowledged. OT eval attempted at 1035 AM - MRI OF BACK NOTE TO BE COMPLETED 4/12. Ortho PA advised therapy to hold until cleared by orthopedic. Will continue to follow patient and OT  EVAL WILL BE COMPLETED WHEN ADVISED BY ORTHO TO PROCEED WITH ASSESSMENT.

## 2021-04-14 NOTE — PROCEDURES
Interventional Radiology Post Procedure Note    4/14/2021    Indications: Acute T10 compression fracture with marked marrow edema seen on MR    Procedure(s): T10 kyphoplasty    Preliminary Findings (full report to follow): See report    Fluoro Time: See report    Contrast: None    Specimen: None    Implants: See above    Estimated blood loss: Minimal    Complications: None    Plan: Bedrest with continued neurological exam for the next 1 hour    Ambulate as tolerated afterwards     Follow Up: in 1 month with [facility]

## 2021-04-14 NOTE — PROGRESS NOTES
Hospitalist Progress Note    Subjective:   Daily Progress Note: 4/14/2021 8:42 AM    Hospital Course: Patient is a 80 y.o. female who presented to the emergency room on 4/11/2021 from [address] for vague pain in her left side. Patient has a history of congestive heart failure, dementia, hypertension, anxiety, constipation. Patient is a poor historian due to the dementia. Per [address] patient has not had any recent falls or injuries. However over the last few days she has had decreased activity and complaining of left-sided pain. CT of the head showed no acute abnormality but cortical atrophy with chronic small vessel disease. CT of the lumbar spine showed chronic compression fractures of L2 and L5, compression fractures of L3 and L4 age-indeterminate. Posterior vertebral body cortical buckling at the L4 level with severe spinal canal narrowing. CT of the thoracic spine showed multiple compression fractures at T4, T7, T8 which appear chronic. Ortho and IR consulted. MRI pending. Per POA she has had a mulitple falls over the past few months.  Kyphoplasty T10 scheduled 4/14      Subjective: Thoracic back pain     Current Facility-Administered Medications   Medication Dose Route Frequency    atorvastatin (LIPITOR) tablet 10 mg  10 mg Oral DAILY    busPIRone (BUSPAR) tablet 10 mg  10 mg Oral BID    carvediloL (COREG) tablet 3.125 mg  3.125 mg Oral BID    cholecalciferol (VITAMIN D3) (5000 Units/125 mcg) tablet 5,000 Units  5,000 Units Oral DAILY    gabapentin (NEURONTIN) capsule 100 mg  100 mg Oral DAILY    mirtazapine (REMERON) tablet 7.5 mg  7.5 mg Oral QHS    senna-docusate (PERICOLACE) 8.6-50 mg per tablet 1 Tab  1 Tab Oral QHS    spironolactone (ALDACTONE) tablet 25 mg  25 mg Oral DAILY    sodium chloride (NS) flush 5-40 mL  5-40 mL IntraVENous Q8H    sodium chloride (NS) flush 5-40 mL  5-40 mL IntraVENous PRN    acetaminophen (TYLENOL) tablet 650 mg  650 mg Oral Q6H PRN    Or    acetaminophen (TYLENOL) suppository 650 mg  650 mg Rectal Q6H PRN    ondansetron (ZOFRAN ODT) tablet 4 mg  4 mg Oral Q8H PRN    Or    ondansetron (ZOFRAN) injection 4 mg  4 mg IntraVENous Q6H PRN    famotidine (PEPCID) tablet 20 mg  20 mg Oral BID    polyethylene glycol (MIRALAX) packet 17 g  17 g Oral DAILY PRN    traMADoL (ULTRAM) tablet 50 mg  50 mg Oral Q6H PRN        Review of Systems  Constitutional: No fevers, No chills, No sweats, ++ fatigue,++ Weakness  Eyes: No redness  Ears, nose, mouth, throat, and face: No nasal congestion, No sore throat, No voice change  Respiratory: No Shortness of Breath, No cough, No wheezing  Cardiovascular: No chest pain, No palpitations, No extremity edema  Gastrointestinal: No nausea, No vomiting, No diarrhea, No abdominal pain  Genitourinary: No frequency, No dysuria, No hematuria  Integument/breast: No skin lesion(s)   Neurological: +Confusion, No headaches, No dizziness      Objective:     Visit Vitals  /64   Pulse 94   Temp 98.5 °F (36.9 °C)   Resp 18   Ht 5' 3\" (1.6 m)   SpO2 93%   Breastfeeding No   BMI 22.00 kg/m²      O2 Device: None (Room air)    Temp (24hrs), Av °F (36.7 °C), Min:97.5 °F (36.4 °C), Max:98.5 °F (36.9 °C)      No intake/output data recorded.  1901 -  0700  In: 120 [P[address]]  Out: -     PHYSICAL EXAM:  Constitutional: elderly appearing, No acute distress  Skin: Extremities and face reveal no rashes. HEENT: Sclerae anicteric. Extra-occular muscles are intact. No oral ulcers. Cardiovascular: Regular rate and rhythm. Respiratory:  Clear breath sounds bilaterally with no wheezes, rales, or rhonchi. GI: Abdomen nondistended, soft, and nontender. Normal active bowel sounds. Musculoskeletal: No pitting edema of the lower legs. Able to move all ext. Proximal lumbar spine back pain 2+  Neurological:  Patient is alert and oriented.  Cranial nerves II-XII grossly intact  Psychiatric: Mood appears appropriate       Data Review    No results found for this or any previous visit (from the past 24 hour(s)). Radiology review: CT lumbar and thoracic spine    Assessment:   1. Multiple lumbar and thoracic compression fractures  2. Dementia without behavioral disturbances  3. History of CHF  4. Hypertension  5. Constipation  6. History of RA    Plan:    1. MRI of the T- spine acute fracture with bone marrow edema. MRI of the lumbar spine shows L2-L5 compression fracture chronic. Posterior cortical buckling at the L4 fracture in conjunction with degenerative changes are present with severe spinal canal narrowing. Neuroforaminal narrowing is significant severe of the right L4-L5 region. MRI of the thoracic spine reveals a T10 inferior endplate acute compression fracture with bone marrow edema present. Mild edema consistent with subacute fracture of T6, T8 and T12. Moderate to severe spinal cord narrowing of T9 and T10. Interventional radiology and orthopedics consulted. Tylenol, tramadol, morphine as needed for pain. She normally takes aspirin and Plavix   Kyphoplasty T10 4/14    2. Continue with Remeron  3. Blood pressure stable. Continue with Coreg. 4.  On stool softener  5. CBC CMP in a.m.  6.  Case management for discharge planning  7. We will consult PT and OT after orthopedic and interventional radiology evaluation    ALISSA Munson - Cell [phone], home [phone] --> updated on 4/14/2021      CODE STATUS DNR     DVT prophylaxis: SCDs  Ulcer prophylaxis: Parmova 70 discussed with: Patient/Family, Nurse and     Total time spent with patient: >35 minutes.

## 2021-04-14 NOTE — ANESTHESIA POSTPROCEDURE EVALUATION
* No procedures listed *.     MAC    Anesthesia Post Evaluation      Multimodal analgesia: multimodal analgesia used between 6 hours prior to anesthesia start to PACU discharge  Patient location during evaluation: PACU  Patient participation: complete - patient participated  Level of consciousness: awake  Pain score: 0  Pain management: adequate  Airway patency: patent  Anesthetic complications: no  Cardiovascular status: acceptable  Respiratory status: acceptable  Hydration status: acceptable  Post anesthesia nausea and vomiting:  controlled  Final Post Anesthesia Temperature Assessment:  Normothermia (36.0-37.5 degrees C)      INITIAL Post-op Vital signs:   Vitals Value Taken Time   /59 04/14/21 1543   Temp 36.1 °C (97 °F) 04/14/21 1543   Pulse 69 04/14/21 1543   Resp 18 04/14/21 1543   SpO2 100 % 04/14/21 1543

## 2021-04-15 ENCOUNTER — APPOINTMENT (OUTPATIENT)
Dept: GENERAL RADIOLOGY | Age: 86
DRG: 478 | End: 2021-04-15
Attending: PHYSICIAN ASSISTANT
Payer: MEDICARE
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
## 2021-04-15 PROBLEM — R53.1 WEAKNESS GENERALIZED: Status: ACTIVE | Noted: 2021-04-15

## 2021-04-15 LAB
ALBUMIN SERPL-MCNC: 2.6 G/DL (ref 3.5–5)
ALBUMIN/GLOB SERPL: 0.7 {RATIO} (ref 1.1–2.2)
ALP SERPL-CCNC: 93 U/L (ref 45–117)
ALT SERPL-CCNC: 10 U/L (ref 12–78)
ANION GAP SERPL CALC-SCNC: 4 MMOL/L (ref 5–15)
AST SERPL W P-5'-P-CCNC: 12 U/L (ref 15–37)
BASOPHILS # BLD: 0 K/UL (ref 0–0.1)
BASOPHILS NFR BLD: 0 % (ref 0–1)
BILIRUB SERPL-MCNC: 0.5 MG/DL (ref 0.2–1)
BUN SERPL-MCNC: 34 MG/DL (ref 6–20)
BUN/CREAT SERPL: 30 (ref 12–20)
CA-I BLD-MCNC: 9 MG/DL (ref 8.5–10.1)
CHLORIDE SERPL-SCNC: 105 MMOL/L (ref 97–108)
CO2 SERPL-SCNC: 28 MMOL/L (ref 21–32)
CREAT SERPL-MCNC: 1.14 MG/DL (ref 0.55–1.02)
DIFFERENTIAL METHOD BLD: ABNORMAL
EOSINOPHIL # BLD: 0.1 K/UL (ref 0–0.4)
EOSINOPHIL NFR BLD: 1 % (ref 0–7)
ERYTHROCYTE [DISTWIDTH] IN BLOOD BY AUTOMATED COUNT: 14.4 % (ref 11.5–14.5)
GLOBULIN SER CALC-MCNC: 3.8 G/DL (ref 2–4)
GLUCOSE SERPL-MCNC: 98 MG/DL (ref 65–100)
HCT VFR BLD AUTO: 34.1 % (ref 35–47)
HGB BLD-MCNC: 10.7 G/DL (ref 11.5–16)
IMM GRANULOCYTES # BLD AUTO: 0 K/UL (ref 0–0.04)
IMM GRANULOCYTES NFR BLD AUTO: 0 % (ref 0–0.5)
LYMPHOCYTES # BLD: 1.5 K/UL (ref 0.8–3.5)
LYMPHOCYTES NFR BLD: 18 % (ref 12–49)
MCH RBC QN AUTO: 30.9 PG (ref 26–34)
MCHC RBC AUTO-ENTMCNC: 31.4 G/DL (ref 30–36.5)
MCV RBC AUTO: 98.6 FL (ref 80–99)
MONOCYTES # BLD: 1 K/UL (ref 0–1)
MONOCYTES NFR BLD: 13 % (ref 5–13)
NEUTS SEG # BLD: 5.5 K/UL (ref 1.8–8)
NEUTS SEG NFR BLD: 68 % (ref 32–75)
PLATELET # BLD AUTO: 215 K/UL (ref 150–400)
PMV BLD AUTO: 9.7 FL (ref 8.9–12.9)
POTASSIUM SERPL-SCNC: 4.2 MMOL/L (ref 3.5–5.1)
PROT SERPL-MCNC: 6.4 G/DL (ref 6.4–8.2)
RBC # BLD AUTO: 3.46 M/UL (ref 3.8–5.2)
SODIUM SERPL-SCNC: 137 MMOL/L (ref 136–145)
WBC # BLD AUTO: 8.1 K/UL (ref 3.6–11)

## 2021-04-15 PROCEDURE — 97166 OT EVAL MOD COMPLEX 45 MIN: CPT

## 2021-04-15 PROCEDURE — 74011250637 HC RX REV CODE- 250/637: Performed by: PHYSICIAN ASSISTANT

## 2021-04-15 PROCEDURE — 80053 COMPREHEN METABOLIC PANEL: CPT

## 2021-04-15 PROCEDURE — 97162 PT EVAL MOD COMPLEX 30 MIN: CPT

## 2021-04-15 PROCEDURE — 85025 COMPLETE CBC W/AUTO DIFF WBC: CPT

## 2021-04-15 PROCEDURE — 81001 URINALYSIS AUTO W/SCOPE: CPT

## 2021-04-15 PROCEDURE — 97530 THERAPEUTIC ACTIVITIES: CPT

## 2021-04-15 PROCEDURE — 36415 COLL VENOUS BLD VENIPUNCTURE: CPT

## 2021-04-15 PROCEDURE — 87186 SC STD MICRODIL/AGAR DIL: CPT

## 2021-04-15 PROCEDURE — 71045 X-RAY EXAM CHEST 1 VIEW: CPT

## 2021-04-15 PROCEDURE — 87077 CULTURE AEROBIC IDENTIFY: CPT

## 2021-04-15 PROCEDURE — 87086 URINE CULTURE/COLONY COUNT: CPT

## 2021-04-15 PROCEDURE — 65270000032 HC RM SEMIPRIVATE

## 2021-04-15 RX ADMIN — TRAMADOL HYDROCHLORIDE 50 MG: 50 TABLET, FILM COATED ORAL at 20:11

## 2021-04-15 RX ADMIN — BUSPIRONE HYDROCHLORIDE 10 MG: 10 TABLET ORAL at 20:12

## 2021-04-15 RX ADMIN — FAMOTIDINE 20 MG: 20 TABLET ORAL at 10:45

## 2021-04-15 RX ADMIN — CARVEDILOL 3.12 MG: 3.12 TABLET, FILM COATED ORAL at 20:12

## 2021-04-15 RX ADMIN — SPIRONOLACTONE 25 MG: 25 TABLET ORAL at 10:45

## 2021-04-15 RX ADMIN — BUSPIRONE HYDROCHLORIDE 10 MG: 10 TABLET ORAL at 10:45

## 2021-04-15 RX ADMIN — MIRTAZAPINE 7.5 MG: 15 TABLET, FILM COATED ORAL at 20:13

## 2021-04-15 RX ADMIN — ATORVASTATIN CALCIUM 10 MG: 10 TABLET, FILM COATED ORAL at 10:45

## 2021-04-15 RX ADMIN — CARVEDILOL 3.12 MG: 3.12 TABLET, FILM COATED ORAL at 10:45

## 2021-04-15 RX ADMIN — ACETAMINOPHEN 650 MG: 325 TABLET, FILM COATED ORAL at 20:11

## 2021-04-15 RX ADMIN — Medication 10 ML: at 16:48

## 2021-04-15 RX ADMIN — CHOLECALCIFEROL TAB 125 MCG (5000 UNIT) 5000 UNITS: 125 TAB at 10:45

## 2021-04-15 RX ADMIN — FAMOTIDINE 20 MG: 20 TABLET ORAL at 20:12

## 2021-04-15 RX ADMIN — GABAPENTIN 100 MG: 100 CAPSULE ORAL at 10:45

## 2021-04-15 NOTE — PROGRESS NOTES
Screen completed. SLP notified by OT that pt with s/s aspiration with liquids. Please order SLP consult if medically indicated.

## 2021-04-15 NOTE — PROGRESS NOTES
Comprehensive Nutrition Assessment    Type and Reason for Visit: Reassess(Interim)    Nutrition Recommendations/Plan:     Downgrade to Pureed/HTL for safety per Home diet  Await SLP eval for final LRD recs and adjust accordingly    D/c supplementation    Provide PRN bowel regimen    Document PO intakes, BMs, weekly wts    Nutrition Assessment:  Admitted for L sided pain. Ortho consulted, MRI spine results noted. S/p kyphoplasty (4/14). Ordered Pureed/thin diet on admit, then Ground/thin per MD. SLP recently consulted for choking episode on thin liquids. Able to reach nsg staff from NH and noted Pureed/HTL is baseline, pt downgraded for safety. RD visualized 100% intake of Ground foods at lunch today, awaiting SLP eval inpatient to determine if pt appropriate for upgrade. Per EMR PO intakes % during admit. RN denied previous choking episodes. RD to d/c thin liq supps and monitor. Labs unremarkable. Meds: Statin, mirtazipine, senna-docusate, coreg, pepcid, spironolactone, miralax, tramadol. Malnutrition Assessment:  Malnutrition Status:  No malnutrition    Context:  Acute illness     Findings of the 6 clinical characteristics of malnutrition:   Energy Intake:  No significant decrease in energy intake  Weight Loss:  No significant weight loss     Body Fat Loss:  No significant body fat loss,     Muscle Mass Loss:  No significant muscle mass loss,    Fluid Accumulation:  No significant fluid accumulation,        Nutrition Related Findings:  Brief NFPE unremarkable - limited d/t pt working with OT. Hx of dysphagia noted, home diet Pureed/HTL. No n/v or c/d. No BM recorded since admit, discussed providing PRN bowel reg with nsg. No edema.       Wounds:    None       Current Nutrition Therapies:  DIET NUTRITIONAL SUPPLEMENTS Dinner; Ensure Pudding  DIET DYSPHAGIA MECH ALTERED (NDD2)     Anthropometric Measures:  · Height:  5' 3\" (160 cm)  · Current Body Wt:  56.3 kg (124 lb 1.9 oz)(4/11)   · Usual Body Wt:  (VIGNESH) · Ideal Body Wt:  115 lbs:  107.9 %   · BMI Category:  Normal weight (BMI 22.0-24.9) age over 72       Nutrition Diagnosis:   · Biting/chewing (masticatory) difficulty related to cognitive or neurological impairment, partial or complete edentulism as evidenced by (Ground/thin diet per SLP)      Nutrition Interventions:   Food and/or Nutrient Delivery: Modify current diet, Modify oral nutrition supplement  Nutrition Education and Counseling: No recommendations at this time  Coordination of Nutrition Care: Swallow evaluation, Speech therapy, Continue to monitor while inpatient, Feeding assistance/environmental change    Goals:  Meet >65% est needs x 5-7 days, Wt maintenance +/- 1kg, Lytes WNL       Nutrition Monitoring and Evaluation:   Behavioral-Environmental Outcomes: None identified  Food/Nutrient Intake Outcomes: Food and nutrient intake, Diet advancement/tolerance  Physical Signs/Symptoms Outcomes: GI status, Weight, Chewing or swallowing, Meal time behavior, Biochemical data    Discharge Planning:     Too soon to determine     Electronically signed by Kylie Angeles on 4/15/2021 at 2:42 PM    Contact: [phone] or via [facility]

## 2021-04-15 NOTE — DISCHARGE SUMMARY
Admit date: 4/11/2021   Admitting Provider: Viky Moore MD    Discharge date: 4/15/2021  Discharging Provider: Becki Wiley PA-C      * Admission Diagnoses: Compression fracture of body of thoracic vertebra [facility]) [E23.194M]    * Discharge Diagnoses:    Hospital Problems as of 4/15/2021 Date Reviewed: 4/15/2021          Codes Class Noted - Resolved POA    Weakness generalized ICD-10-CM: R53.1  ICD-9-CM: 780.79  4/15/2021 - Present Unknown        Falls ICD-10-CM: W19. Santos Brace  ICD-9-CM: E888.9  4/15/2021 - Present Unknown        Compression fracture of body of thoracic vertebra [facility]) ICD-10-CM: S22.000A  ICD-9-CM: 805.2  4/11/2021 - Present Unknown              * Hospital Course:   Patient is a 80 y. o. female who presented to the emergency room on 4/11/2021 from [address] for vague pain in her left side.  Patient has a history of congestive heart failure, dementia, hypertension, anxiety, constipation.  Patient is a poor historian due to the dementia.   Per [address] patient has not had any recent falls or injuries. [place] over the last few days she has had decreased activity and complaining of left-sided pain.  CT of the head showed no acute abnormality but cortical atrophy with chronic small vessel disease.  CT of the lumbar spine showed chronic compression fractures of L2 and L5, compression fractures of L3 and L4 age-indeterminate.  Posterior vertebral body cortical buckling at the L4 level with severe spinal canal narrowing.  CT of the thoracic spine showed multiple compression fractures at T4, T7, T8 which appear chronic. Ortho and IR consulted. MRI of the lumbar spine shows L2-L5 compression fracture chronic. Posterior cortical buckling at the L4 fracture in conjunction with degenerative changes are present with severe spinal canal narrowing.   Neuroforaminal narrowing is significant severe of the right L4-L5 region.   MRI of the thoracic spine reveals a T10 inferior endplate acute compression fracture with bone marrow edema present. Mild edema consistent with subacute fracture of T6, T8 and T12. Moderate to severe spinal cord narrowing of T9 and T10. . Per POA she has had a mulitple falls over the past few months. Kyphoplasty T10 performed with no complications. Pain improved. Ambulating with PT/OT. Restarted home diet of honey thick fluids rather than thin liquids. CXR pending. Discussed discharge with patient power of  Dhruv Ocampo    * Procedures:   * No surgery found *  Cardiology and IR Orders (From admission to next 24h)     Start     Ordered    04/13/21 1100  IR KYPHOPLASTY THORACIC  [[id number]]  ONE TIME      04/13/21 1054                Consults:   Orthopedics and interventional radiology    Significant Diagnostic Studies: As discussed in hospital course    Discharge Exam:  Visit Vitals  /60   Pulse 69   Temp 98.1 °F (36.7 °C)   Resp 20   Ht 5' 3\" (1.6 m)   SpO2 93%   Breastfeeding No   BMI 22.00 kg/m²     PHYSICAL EXAM:  Constitutional: elderly appearing, No acute distress  Skin: Extremities and face reveal no rashes. HEENT: Sclerae anicteric. Extra-occular muscles are intact. No oral ulcers. Cardiovascular: Regular rate and rhythm. Respiratory:  Clear breath sounds bilaterally with no wheezes, rales, or rhonchi. GI: Abdomen nondistended, soft, and nontender. Normal active bowel sounds. Musculoskeletal: No pitting edema of the lower legs. Able to move all ext. Proximal lumbar spine back pain mild at incision site  Neurological:  Patient is alert and oriented. Cranial nerves II-XII grossly intact  Psychiatric: Mood appears appropriate       * Discharge Condition: stable  * Disposition: Home    Discharge Medications:  Current Discharge Medication List      CONTINUE these medications which have NOT CHANGED    Details   aspirin delayed-release 81 mg tablet Take 81 mg by mouth daily. busPIRone (BUSPAR) 10 mg tablet Take 10 mg by mouth two (2) times a day. carvediloL (COREG) 3.125 mg tablet Take 3.125 mg by mouth two (2) times a day. clopidogreL (PLAVIX) 75 mg tab Take 75 mg by mouth daily. gabapentin (NEURONTIN) 100 mg capsule Take 100 mg by mouth daily. sennosides/docusate sodium (SENNA PLUS PO) Take 1 Tab by mouth two (2) times a day. spironolactone (ALDACTONE) 25 mg tablet Take 25 mg by mouth daily. cholecalciferol (VITAMIN D3) (5000 Units/125 mcg) tab tablet Take 5,000 Units by mouth daily. atorvastatin (LIPITOR) 10 mg tablet Take 10 mg by mouth daily. mirtazapine (REMERON) 7.5 mg tablet Take 7.5 mg by mouth nightly. acetaminophen (TYLENOL) 325 mg tablet Take 650 mg by mouth every six (6) hours as needed for Pain.      polyethylene glycol (MIRALAX) 17 gram/dose powder Take 17 g by mouth daily as needed for Constipation. Indications: constipation             * Follow-up Care/Patient Instructions:   Activity: Activity as tolerated  Diet: Honey thick fluids  Wound Care: None needed    Follow-up Information     Follow up With Specialties Details Why Contact Info    Yoav Prado NP Nurse Practitioner, Nurse Practitioner   Kristina Stockton [phone]      Dr. Dex Contreras interventional radiology   In 1 month  [phone] appointment with interventional radiology in 1 month          Discharge summary greater than 35 minutes spent with the patient performing discharge instructions, medication review and physical exam    Signed:  Gudelia Doherty PA-C  4/15/2021  2:48 PM

## 2021-04-15 NOTE — PROGRESS NOTES
PHYSICAL THERAPY EVALUATION  Patient: Hailey Linder (74 y.o. female)  Date: 4/15/2021  Primary Diagnosis: Compression fracture of body of thoracic vertebra (HCC) [S22.000A]        Precautions: falls, spinal       ASSESSMENT  Pt is 81 y/o female came to [facility] from [facility] for left side pain and adm 4/11/2021 for multiple lumbar and thoracic compression fractures (MRI showing L2-L5 compression fracures appear chronic, Posterior cortical buckling at L4 fracture in conjunction with degenerative changes at level results in severe spinal canal narrowing, neuroformainal narrowing most significant and severe on the right L4-5, acute appearing compression fractures involving T10 inferior endplate, compression fractures at T6, T9 and T12 with residual mild marrow edema but do not appear acute, spinal canal narrowing appears most sig and moderate to severe T9-T10), acute T10 compression fracture with marked marrow edema seen on MRI and is s/p T10 kyphoplasty (4/14/2021 by Dr. Keshawn Vargas and is WBAT).    Pt has hx of dementia, gastrointestinatl disorder, heart failure, HTN, anxiety. Pt received semi supine in bed A&O to name only (reoriented on place, situation and time) and agreeable for PT/OT session. PT unable to provide PLOF information thus PT called [facility] ([facility]) and [facility] reporting pt required close supervision for bed to [facility] transfer and required assist for bathing and dressing and is extremly [place] (hearing aids not working). [facility] also reporting pt on honey thick liquids.      Pt currently presents with decreased balance, generalized weakness, and increased need for assist with amb, and functional transfers/mobility. Pt required CGA roll and sup->sit, min A scooting, min/mod Ax2 sit<->stand and toilet transfer with Rw and gait belt, mod Ax2 sit->sup. Pt amb bed <> bathroom with RW, gt belt, and min to mod A x2 demonstrates short, shuffling gt pattern with NBOS, requiring constant guidance of RW by PT.  Pt would benefit from skilled PT services while at [facility] in order to increase safety and independence with amb and functional transfers/mobility. Recommend discharge back to [facility] with HHPT when medically appropriate.        Other factors to consider for discharge: time since onset, severity of deficits, PLOF      PLAN :  Recommendations and Planned Interventions: bed mobility training, transfer training, gait training, therapeutic exercises, patient and family training/education and therapeutic activities      Frequency/Duration: Patient will be followed by physical therapy:  5 times a week to address goals. Recommendation for discharge: (in order for the patient to meet his/her long term goals)  Return to [facility] with [facility]    This discharge recommendation:  Has been made in collaboration with the attending provider and/or case management    IF patient discharges home will need the following DME: none         SUBJECTIVE:   Patient stated i'm ok.     OBJECTIVE DATA SUMMARY:   HISTORY:    Past Medical History:   Diagnosis Date    Dementia ([facility]     Gastrointestinal disorder     constipation    Heart failure ([facility]     Hypertension     Psychiatric disorder     anxiety     Past Surgical History:   Procedure Laterality Date    IR KYPHOPLASTY THORACIC  4/14/2021         Home Situation  Home Environment: Assisted living  [address] [place] [address] Name: [facility]  Living Alone: No  Support Systems: Assisted living    EXAMINATION/PRESENTATION/DECISION MAKING:   Critical Behavior:  Neurologic State: Alert  Orientation Level: Oriented to person(EXTREMELY [place])  Cognition: Impaired decision making, Follows commands     Hearing:   Auditory  Auditory Impairment: Deaf  Hearing Aids/Status: Left, Not functioning  Skin:  2 Band-Aids noted on thoracic spine  Edema: none noted  Range Of Motion:  AROM: Generally decreased, functional           PROM: Generally decreased, functional           Strength:    Strength: Generally decreased, functional Tone & Sensation:                                  Coordination:     Vision:      Functional Mobility:  Bed Mobility:  Rolling: Contact guard assistance  Supine to Sit: Contact guard assistance  Sit to Supine: Moderate assistance;Assist x2  Scooting: Minimum assistance  Transfers:  Sit to Stand: Minimum assistance; Moderate assistance;Assist x2  Stand to Sit: Minimum assistance; Moderate assistance;Assist x2                       Balance:   Sitting: Intact; Without support  Standing: Impaired; With support  Standing - Static: Fair;Occasional  Standing - Dynamic : Poor;Constant support  Ambulation/Gait Training:  Distance (ft): 20 Feet (ft)  Assistive Device: Gait belt;Walker, rolling  Ambulation - Level of Assistance: Minimal assistance; Moderate assistance;Assist x2     Gait Description (WDL): Exceptions to [facility]           Base of Support: Narrowed     Speed/Clotilde: Shuffled; Slow  Step Length: Left shortened;Right shortened       Therapeutic Exercises:   Not completed this session    Functional Measure:  [address] [place] [id number] AM-PAC 6 Clicks         Basic Mobility Inpatient Short Form  How much difficulty does the patient currently have. .. Unable A Lot A Little None   1. Turning over in bed (including adjusting bedclothes, sheets and blankets)? [] 1   [x] 2   [] 3   [] 4   2. Sitting down on and standing up from a chair with arms ( e.g., wheelchair, bedside commode, etc.)   [] 1   [x] 2   [] 3   [] 4   3. Moving from lying on back to sitting on the side of the bed? [] 1   [x] 2   [] 3   [] 4          How much help from another person does the patient currently need. .. Total A Lot A Little None   4. Moving to and from a bed to a chair (including a wheelchair)? [] 1   [x] 2   [] 3   [] 4   5. Need to walk in hospital room? [] 1   [x] 2   [] 3   [] 4   6. Climbing 3-5 steps with a railing? [] 1   [x] 2   [] 3   [] 4   © 2007, Trustees of [address] [place] [address] [address] [address], under license to [employer].  All rights reserved     Score:  Initial:  Most Recent: X (Date: 4/15/21 )   Interpretation of Tool:  Represents activities that are increasingly more difficult (i.e. Bed mobility, Transfers, Gait). Score 24 23 22-20 19-15 14-10 9-7 6   Modifier CH CI CJ CK CL CM CN         Physical Therapy Evaluation Charge Determination   History Examination Presentation Decision-Making   HIGH Complexity :3+ comorbidities / personal factors will impact the outcome/ POC  HIGH Complexity : 4+ Standardized tests and measures addressing body structure, function, activity limitation and / or participation in recreation  MEDIUM Complexity : Evolving with changing characteristics  Other outcome measures ampac 6  mod      Based on the above components, the patient evaluation is determined to be of the following complexity level: MEDIUM    Pain Ratin/10 reported    Activity Tolerance:   Fair and requires rest breaks    After treatment patient left in no apparent distress:   Supine in bed, Call bell within reach, Bed / chair alarm activated and Side rails x 3 and nsg updated. GOALS:    Problem: Mobility Impaired (Adult and Pediatric)  Goal: *Acute Goals and Plan of Care (Insert Text)  Description: Pt will be I with LE HEP in 7 days. Pt will perform bed mobility with mod I in 7 days. Pt will perform transfers with mod I in 7 days. Pt will amb 10-25 feet with LRAD safely with mod I in 7 days. Outcome: Not Met       COMMUNICATION/EDUCATION:   The patients plan of care was discussed with: Occupational therapist, Speech therapist, Registered nurse, Physician, Case management and dietian . Patient is unable to participate in goal setting and plan of care. PT/OT sessions occurred together for increased safety of pt and clinician.     Thank you for this referral.  Carli Koehler, PT, DPT   Time Calculation: 51 mins

## 2021-04-15 NOTE — PROGRESS NOTES
CM called [facility] [phone] to inquire if patient can return to their assisted living facility today. Patient may return today, Clinical information faxed via [employer] to [phone]. Patient can return to room A16, nurse to call report to [phone]. CM called ALISSA Oliva  [phone], telephone went straight to voicemail, CM left voicemail, requesting return cellular telephone call.

## 2021-04-15 NOTE — PROGRESS NOTES
Problem: Self Care Deficits Care Plan (Adult)  Goal: *Acute Goals and Plan of Care (Insert Text)  Description: Pt will be MI sup<->sit in prep for EOB ADL's  Pt will be min A  LB dressing EOB level  Pt will be MI  grooming EOB level  Pt will be MI  sit<-> prep for toilet transfer  Pt will be MI  BSC transfer with LRAD  Pt will be MI  toileting/cloth mgmt LRAD  Pt will be MI  grooming standing sink  Pt will be MI bathing sitting/standing sink LRAD  Pt will be MI felix UE HEP in prep for self care tasks      Outcome: Not Met     OCCUPATIONAL THERAPY EVALUATION  Patient: Sathya Perez (22 y.o. female)  Date: 4/15/2021  Primary Diagnosis: Compression fracture of body of thoracic vertebra ([address] [S22.000A]        Precautions: back precautions       ASSESSMENT  Pt is 81 y/o female came to [facility] from [facility] for left side pain and adm 4/11/2021 for multiple lumbar and thoracic compression fractures (MRI showing L2-L5 compression fracures appear chronic, Posterior cortical buckling at L4 fracture in conjunction with degenerative changes at level results in severe spinal canal narrowing, neuroformainal narrowing most significant and severe on the right L4-5, acute appearing compression fractures involving T10 inferior endplate, compression fractures at T6, T9 and T12 with residual mild marrow edema but do not appear acute, spinal canal narrowing appears most sig and moderate to severe T9-T10), acute T10 compression fracture with marked marrow edema seen on MRI and is s/p T10 kyphoplasty (4/14/2021 by Dr. Marta Muhammad and is WBAT). Pt has hx of dementia, gastrointestinatl disorder, heart failure, HTN, anxiety. Pt received semi supine in bed A&O to name only (reoriented on place, situation and time) and agreeable for OT/PT eval/tx.  PT unable to provide PLOF information thus PT called assisted ([facility]) and KIT reporting pt required close supervision for bed to [facility] transfer and required assist for bathing and dressing and is extremly [place] (hearing aids not working). KIT also reporting pt on honey thick liquids. Pt currently presents with decreased balance, generalized weakess (felix UE grossly 3+/5) and increased need for assist with self care (mod A LB dressing bed level, SBA simple grooming bed level, mod A toileting during stance 2/2 balance, SBA UB bathing semi supine in bed, min A UB dressing 2/2 line management and large gown) and functional transfers/mobility (CGA roll and sup->sit, min A scooting, min/mod Ax2 sit<->stand and toilet transfer with Rw and gait belt, mod Ax2 sit->sup). Pt would benefit from skilled OT services while at [facility] in order to increase safety and independence with self care and functional transfers/mobility. Recommend discharge back to [facility] with HHOT when medically appropriate. Other factors to consider for discharge: time since onset, severity of deficits, PLOF         PLAN :  Recommendations and Planned Interventions: self care training, functional mobility training, therapeutic exercise, balance training, therapeutic activities, endurance activities, patient education, and home safety training    Frequency/Duration: Patient will be followed by occupational therapy 5 times a week to address goals. Recommendation for discharge: (in order for the patient to meet his/her long term goals)  HHOT     This discharge recommendation:  Has been made in collaboration with the attending provider and/or case management    IF patient discharges home will need the following DME: TBD       SUBJECTIVE:   Patient stated i am just stiff.     OBJECTIVE DATA SUMMARY:   HISTORY:   Past Medical History:   Diagnosis Date    Dementia ([address]     Gastrointestinal disorder     constipation    Heart failure ([address]     Hypertension     Psychiatric disorder     anxiety     Past Surgical History:   Procedure Laterality Date    IR KYPHOPLASTY THORACIC  4/14/2021       Expanded or extensive additional review of patient history:     Home Situation  Home Environment: [address] [place] [address] Road Name: [facility]  Living Alone: No  Support Systems: Assisted living    PLOF: Pt requires assist for ADLS/IADLS, requires assist with mobility prior to admission (supervision transfer to [facility]). EXAMINATION OF PERFORMANCE DEFICITS:  Cognitive/Behavioral Status:  Neurologic State: Alert  Orientation Level: Oriented to person(EXTREMELY [place])     Hearing: Auditory  Auditory Impairment: Deaf  Hearing Aids/Status: Left, Not functioning    Range of Motion:  AROM: Generally decreased, functional  PROM: Generally decreased, functional     Strength:  Strength: Generally decreased, functional     Balance:  Sitting: Intact; Without support  Standing: Impaired; With support  Standing - Static: Fair;Occasional  Standing - Dynamic : Poor;Constant support    Functional Mobility and Transfers for ADLs:  Bed Mobility:  Rolling: Contact guard assistance  Supine to Sit: Contact guard assistance  Sit to Supine: Moderate assistance;Assist x2  Scooting: Minimum assistance    Transfers:  Sit to Stand: Minimum assistance; Moderate assistance;Assist x2  Stand to Sit: Minimum assistance; Moderate assistance;Assist x2  Bathroom Mobility: Minimum assistance; Moderate assistance  Toilet Transfer : Minimum assistance; Moderate assistance;Assist x2  Assistive Device : Gait Belt;Walker, rolling    ADL Assessment:  Feeding: Independent;Setup    Oral Facial Hygiene/Grooming: Stand-by assistance    Bathing: (SBA UB bathing with wipes semi supine in bed)    Upper Body Dressing: Minimum assistance    Lower Body Dressing: Moderate assistance    Toileting:  Moderate assistance     ADL Intervention and task modifications:     Grooming  Grooming Assistance: Stand-by assistance  Position Performed: (semi supine in bed)  Washing Face: Stand-by assistance  Washing Hands: Stand-by assistance    Upper Body Bathing  Bathing Assistance: Stand-by assistance  Position Performed: (semi supine in bed)    Upper [address]: Minimum  assistance    Lower Body Dressing Assistance  Socks: Moderate assistance  Position Performed: Supine    Toileting  Toileting Assistance: Moderate assistance  Bladder Hygiene: Moderate assistance  Bowel Hygiene: Moderate assistance  Clothing Management: Moderate assistance       [address] [address] AM-PACTM \"6 Clicks\"                                                       Daily Activity Inpatient Short Form  How much help from another person does the patient currently need. .. Total; A Lot A Little None   1. Putting on and taking off regular lower body clothing? []  1 [x]  2 []  3 []  4   2. Bathing (including washing, rinsing, drying)? []  1 []  2 [x]  3 []  4   3. Toileting, which includes using toilet, bedpan or urinal? [] 1 [x]  2 []  3 []  4   4. Putting on and taking off regular upper body clothing? []  1 []  2 [x]  3 []  4   5. Taking care of personal grooming such as brushing teeth? []  1 []  2 [x]  3 []  4   6. Eating meals? []  1 []  2 []  3 [x]  4   © 2007, Trustees of [address] [place] [address] [address] [address], under license to [employer]. All rights reserved     Score: 17/24     Interpretation of Tool:  Represents clinically-significant functional categories (i.e. Activities of daily living). Percentage of Impairment CH    0%   CI    1-19% CJ    20-39% CK    40-59% CL    60-79% CM    80-99% CN     100%   [facility]  Score 6-24 24 23 20-22 15-19 10-14 7-9 6        Occupational Therapy Evaluation Charge Determination   History Examination Decision-Making   MEDIUM Complexity : Expanded review of history including physical, cognitive and psychosocial  history  MEDIUM Complexity : 3-5 performance deficits relating to physical, cognitive , or psychosocial skils that result in activity limitations and / or participation restrictions MEDIUM Complexity : Patient may present with comorbidities that affect occupational performnce.  Miniml to moderate modification of tasks or assistance (eg, physical or verbal ) with assesment(s) is necessary to enable patient to complete evaluation       Based on the above components, the patient evaluation is determined to be of the following complexity level: MEDIUM  Pain Rating:  No pain reported    Activity Tolerance:   Fair, requires rest breaks, and requires frequent rest breaks  Please refer to the flowsheet for vital signs taken during this treatment. After treatment patient left in no apparent distress:    Supine in bed, Call bell within reach, and Bed / chair alarm activated    COMMUNICATION/EDUCATION:   The patients plan of care was discussed with: Physical therapist, Speech therapist, Registered nurse, Physician, and Case management. Home safety education was provided and the patient/caregiver indicated understanding. and Patient/family have participated as able in goal setting and plan of care. This patients plan of care is appropriate for delegation to [facility].     Thank you for this referral.  Patricia Belt  Time Calculation: 51 mins

## 2021-04-16 LAB
ALBUMIN SERPL-MCNC: 2.5 G/DL (ref 3.5–5)
ALBUMIN/GLOB SERPL: 0.7 {RATIO} (ref 1.1–2.2)
ALP SERPL-CCNC: 100 U/L (ref 45–117)
ALT SERPL-CCNC: 14 U/L (ref 12–78)
ANION GAP SERPL CALC-SCNC: 4 MMOL/L (ref 5–15)
APPEARANCE UR: ABNORMAL
AST SERPL W P-5'-P-CCNC: 14 U/L (ref 15–37)
BACTERIA URNS QL MICRO: NEGATIVE /HPF
BASOPHILS # BLD: 0 K/UL (ref 0–0.1)
BASOPHILS NFR BLD: 0 % (ref 0–1)
BILIRUB SERPL-MCNC: 0.4 MG/DL (ref 0.2–1)
BILIRUB UR QL: NEGATIVE
BUN SERPL-MCNC: 32 MG/DL (ref 6–20)
BUN/CREAT SERPL: 36 (ref 12–20)
CA-I BLD-MCNC: 8.9 MG/DL (ref 8.5–10.1)
CHLORIDE SERPL-SCNC: 106 MMOL/L (ref 97–108)
CO2 SERPL-SCNC: 29 MMOL/L (ref 21–32)
COLOR UR: ABNORMAL
CREAT SERPL-MCNC: 0.88 MG/DL (ref 0.55–1.02)
DIFFERENTIAL METHOD BLD: ABNORMAL
EOSINOPHIL # BLD: 0.4 K/UL (ref 0–0.4)
EOSINOPHIL NFR BLD: 5 % (ref 0–7)
ERYTHROCYTE [DISTWIDTH] IN BLOOD BY AUTOMATED COUNT: 14.3 % (ref 11.5–14.5)
GLOBULIN SER CALC-MCNC: 3.7 G/DL (ref 2–4)
GLUCOSE SERPL-MCNC: 85 MG/DL (ref 65–100)
GLUCOSE UR STRIP.AUTO-MCNC: NEGATIVE MG/DL
HCT VFR BLD AUTO: 33.3 % (ref 35–47)
HGB BLD-MCNC: 10.5 G/DL (ref 11.5–16)
HGB UR QL STRIP: NEGATIVE
IMM GRANULOCYTES # BLD AUTO: 0 K/UL (ref 0–0.04)
IMM GRANULOCYTES NFR BLD AUTO: 0 % (ref 0–0.5)
KETONES UR QL STRIP.AUTO: NEGATIVE MG/DL
LEUKOCYTE ESTERASE UR QL STRIP.AUTO: ABNORMAL
LYMPHOCYTES # BLD: 2 K/UL (ref 0.8–3.5)
LYMPHOCYTES NFR BLD: 26 % (ref 12–49)
MCH RBC QN AUTO: 31.2 PG (ref 26–34)
MCHC RBC AUTO-ENTMCNC: 31.5 G/DL (ref 30–36.5)
MCV RBC AUTO: 98.8 FL (ref 80–99)
MONOCYTES # BLD: 1 K/UL (ref 0–1)
MONOCYTES NFR BLD: 13 % (ref 5–13)
NEUTS SEG # BLD: 4.2 K/UL (ref 1.8–8)
NEUTS SEG NFR BLD: 56 % (ref 32–75)
NITRITE UR QL STRIP.AUTO: NEGATIVE
PH UR STRIP: 6 [PH] (ref 5–8)
PLATELET # BLD AUTO: 230 K/UL (ref 150–400)
PMV BLD AUTO: 9.6 FL (ref 8.9–12.9)
POTASSIUM SERPL-SCNC: 4.3 MMOL/L (ref 3.5–5.1)
PROT SERPL-MCNC: 6.2 G/DL (ref 6.4–8.2)
PROT UR STRIP-MCNC: NEGATIVE MG/DL
RBC # BLD AUTO: 3.37 M/UL (ref 3.8–5.2)
RBC #/AREA URNS HPF: ABNORMAL /HPF (ref 0–5)
SODIUM SERPL-SCNC: 139 MMOL/L (ref 136–145)
SP GR UR REFRACTOMETRY: 1.02 (ref 1–1.03)
UA: UC IF INDICATED,UAUC: ABNORMAL
UROBILINOGEN UR QL STRIP.AUTO: 2 EU/DL (ref 0.1–1)
WBC # BLD AUTO: 7.5 K/UL (ref 3.6–11)
WBC URNS QL MICRO: >100 /HPF (ref 0–4)

## 2021-04-16 PROCEDURE — 85025 COMPLETE CBC W/AUTO DIFF WBC: CPT

## 2021-04-16 PROCEDURE — 80053 COMPREHEN METABOLIC PANEL: CPT

## 2021-04-16 PROCEDURE — 92610 EVALUATE SWALLOWING FUNCTION: CPT

## 2021-04-16 PROCEDURE — 87086 URINE CULTURE/COLONY COUNT: CPT

## 2021-04-16 PROCEDURE — 74011250636 HC RX REV CODE- 250/636: Performed by: PHYSICIAN ASSISTANT

## 2021-04-16 PROCEDURE — 74011000250 HC RX REV CODE- 250: Performed by: PHYSICIAN ASSISTANT

## 2021-04-16 PROCEDURE — 36415 COLL VENOUS BLD VENIPUNCTURE: CPT

## 2021-04-16 PROCEDURE — 74011250637 HC RX REV CODE- 250/637: Performed by: PHYSICIAN ASSISTANT

## 2021-04-16 PROCEDURE — 65270000032 HC RM SEMIPRIVATE

## 2021-04-16 RX ADMIN — TRAMADOL HYDROCHLORIDE 50 MG: 50 TABLET, FILM COATED ORAL at 18:25

## 2021-04-16 RX ADMIN — CEFTRIAXONE SODIUM 2 G: 2 INJECTION, POWDER, FOR SOLUTION INTRAMUSCULAR; INTRAVENOUS at 18:25

## 2021-04-16 RX ADMIN — GABAPENTIN 100 MG: 100 CAPSULE ORAL at 09:40

## 2021-04-16 RX ADMIN — BUSPIRONE HYDROCHLORIDE 10 MG: 10 TABLET ORAL at 09:40

## 2021-04-16 RX ADMIN — CARVEDILOL 3.12 MG: 3.12 TABLET, FILM COATED ORAL at 20:56

## 2021-04-16 RX ADMIN — CARVEDILOL 3.12 MG: 3.12 TABLET, FILM COATED ORAL at 09:40

## 2021-04-16 RX ADMIN — BUSPIRONE HYDROCHLORIDE 10 MG: 10 TABLET ORAL at 20:56

## 2021-04-16 RX ADMIN — SENNOSIDES AND DOCUSATE SODIUM 1 TABLET: 8.6; 5 TABLET ORAL at 22:00

## 2021-04-16 RX ADMIN — Medication 10 ML: at 14:00

## 2021-04-16 RX ADMIN — FAMOTIDINE 20 MG: 20 TABLET ORAL at 20:56

## 2021-04-16 RX ADMIN — MIRTAZAPINE 7.5 MG: 15 TABLET, FILM COATED ORAL at 20:57

## 2021-04-16 RX ADMIN — ATORVASTATIN CALCIUM 10 MG: 10 TABLET, FILM COATED ORAL at 09:40

## 2021-04-16 RX ADMIN — FAMOTIDINE 20 MG: 20 TABLET ORAL at 09:40

## 2021-04-16 RX ADMIN — Medication 10 ML: at 21:04

## 2021-04-16 RX ADMIN — CHOLECALCIFEROL TAB 125 MCG (5000 UNIT) 5000 UNITS: 125 TAB at 09:40

## 2021-04-16 RX ADMIN — SPIRONOLACTONE 25 MG: 25 TABLET ORAL at 09:40

## 2021-04-16 NOTE — PROGRESS NOTES
CM spoke with POKARTHIK Romelia Kushal this morning at [phone], CM notified her of discharge. She agrees at this time. Patient will be going into room A16, nurse to call report to [phone]. CM sent DC Summary to [facility] via [facility]. POA would like [facility] PT and OT with the radha team at [address]. CM to send referral.  Pending acceptance. Attending notified to call POA via perfect serve. Awaiting UA results prior to DC. Patient will need transportation via stretch. CM informed POA transport will most likely be this afternoon.

## 2021-04-16 NOTE — PROGRESS NOTES
UA was seen by attending. Urine culture has been ordered. Pending culture results. Patient will stay until cultures result. IV abx Rocephin initiated. Possible discharge Monday back to [facility]. CM called ALISSA England [phone],  No answer to telephone, CM left detailed voicemail explaining situation.

## 2021-04-16 NOTE — DISCHARGE SUMMARY
Admit date: 4/11/2021   Admitting Provider: Keyon Quinn MD    Discharge date: 4/16/2021  Discharging Provider: Jimy Owens PA-C      * Admission Diagnoses: Compression fracture of body of thoracic vertebra [facility]) [I06.444Q]    * Discharge Diagnoses:    Hospital Problems as of 4/16/2021 Date Reviewed: 4/15/2021          Codes Class Noted - Resolved POA    Weakness generalized ICD-10-CM: R53.1  ICD-9-CM: 780.79  4/15/2021 - Present Unknown        Falls ICD-10-CM: W19. Merle Ek  ICD-9-CM: E888.9  4/15/2021 - Present Unknown        Compression fracture of body of thoracic vertebra [facility]) ICD-10-CM: S22.000A  ICD-9-CM: 805.2  4/11/2021 - Present Unknown              * Hospital Course:   Patient is a 80 y. o. female who presented to the emergency room on 4/11/2021 from [address] for vague pain in her left side.  Patient has a history of congestive heart failure, dementia, hypertension, anxiety, constipation.  Patient is a poor historian due to the dementia.   Per [address] patient has not had any recent falls or injuries. Radames Vital over the last few days she has had decreased activity and complaining of left-sided pain.  CT of the head showed no acute abnormality but cortical atrophy with chronic small vessel disease.  CT of the lumbar spine showed chronic compression fractures of L2 and L5, compression fractures of L3 and L4 age-indeterminate.  Posterior vertebral body cortical buckling at the L4 level with severe spinal canal narrowing.  CT of the thoracic spine showed multiple compression fractures at T4, T7, T8 which appear chronic. Ortho and IR consulted. MRI of the lumbar spine shows L2-L5 compression fracture chronic. Posterior cortical buckling at the L4 fracture in conjunction with degenerative changes are present with severe spinal canal narrowing.   Neuroforaminal narrowing is significant severe of the right L4-L5 region.   MRI of the thoracic spine reveals a T10 inferior endplate acute compression fracture with bone marrow edema present. Mild edema consistent with subacute fracture of T6, T8 and T12. Moderate to severe spinal cord narrowing of T9 and T10. . Per POA she has had a mulitple falls over the past few months. Kyphoplasty T10 performed with no complications. Pain improved. Ambulating with PT/OT. Restarted home diet of honey thick fluids rather than thin liquids. CXR with no acute process. Atelectasis present. Discussed discharge with patient power of  Julissa Brody. No new medications    * Procedures:   * No surgery found *  Cardiology and IR Orders (From admission to next 24h)     Start     Ordered    04/13/21 1100  IR KYPHOPLASTY THORACIC  [[id number]]  ONE TIME      04/13/21 1054                Consults:   Orthopedics and interventional radiology    Significant Diagnostic Studies: As discussed in hospital course    Discharge Exam:  Visit Vitals  BP (!) 110/50   Pulse 72   Temp 97.8 °F (36.6 °C)   Resp 20   Ht 5' 3\" (1.6 m)   SpO2 94%   Breastfeeding No   BMI 22.00 kg/m²     PHYSICAL EXAM:  Constitutional: elderly appearing, No acute distress  Skin: Extremities and face reveal no rashes. HEENT: Sclerae anicteric. Extra-occular muscles are intact. No oral ulcers. Cardiovascular: Regular rate and rhythm. Respiratory:  Clear breath sounds bilaterally with no wheezes, rales, or rhonchi. GI: Abdomen nondistended, soft, and nontender. Normal active bowel sounds. Musculoskeletal: No pitting edema of the lower legs. Able to move all ext. Proximal lumbar spine back pain mild at incision site  Neurological:  Patient is alert and oriented. Cranial nerves II-XII grossly intact  Psychiatric: Mood appears appropriate       * Discharge Condition: stable  * Disposition: Home    Discharge Medications:  Current Discharge Medication List      CONTINUE these medications which have NOT CHANGED    Details   aspirin delayed-release 81 mg tablet Take 81 mg by mouth daily.       busPIRone (BUSPAR) 10 mg tablet Take 10 mg by mouth two (2) times a day. carvediloL (COREG) 3.125 mg tablet Take 3.125 mg by mouth two (2) times a day. clopidogreL (PLAVIX) 75 mg tab Take 75 mg by mouth daily. gabapentin (NEURONTIN) 100 mg capsule Take 100 mg by mouth daily. sennosides/docusate sodium (SENNA PLUS PO) Take 1 Tab by mouth two (2) times a day. spironolactone (ALDACTONE) 25 mg tablet Take 25 mg by mouth daily. cholecalciferol (VITAMIN D3) (5000 Units/125 mcg) tab tablet Take 5,000 Units by mouth daily. atorvastatin (LIPITOR) 10 mg tablet Take 10 mg by mouth daily. mirtazapine (REMERON) 7.5 mg tablet Take 7.5 mg by mouth nightly. acetaminophen (TYLENOL) 325 mg tablet Take 650 mg by mouth every six (6) hours as needed for Pain.      polyethylene glycol (MIRALAX) 17 gram/dose powder Take 17 g by mouth daily as needed for Constipation. Indications: constipation             * Follow-up Care/Patient Instructions:   Activity: Activity as tolerated  Diet: Honey thick fluids  Wound Care: None needed    Follow-up Information     Follow up With Specialties Details Why Contact Info    Eloisa Mancera NP Nurse Practitioner, Nurse Practitioner   Makayla Toro [phone]      Dr. Kayla Baker interventional radiology   In 1 month  [phone] appointment with interventional radiology in 1 month          Discharge summary greater than 35 minutes spent with the patient performing discharge instructions, medication review and physical exam    Signed:  Travis Quijano PA-C  4/16/2021  2:48 PM

## 2021-04-16 NOTE — PROGRESS NOTES
SPEECH LANGUAGE PATHOLOGY BEDSIDE SWALLOW EVALUATIONS  Patient: Lucious Schaumann (88 y.o. female)  Date: 4/16/2021  Primary Diagnosis: Compression fracture of body of thoracic vertebra ([facility]) [S22.000A]        Precautions: aspiration        ASSESSMENT :  Based on the objective data described below, the patient presents with minimal oral dysphagia that may be negatively impacted by cognition. Per records, patient on a baseline diet at NH of puree, honey thick liquids. Patient seen at bedside. Patient [place]. She has difficulty following oral motor verbal commands due to hearing. Administered nectar thick liquids via straw and cup. Patient w/ adequate bolus control and timely swallow initiation. No overt s/sx of aspiration noted w/ all nectar thick trials. Patient tolerated puree breakfast tray w/o difficulty. Slightly impulsivity w/ solid intake. Large bolus size requiring verbal cues and placing entire solid cracker trial in mouth. Mastication reduced. Upper dentures present only. Pharyngeal response adequate. Patient will benefit from skilled intervention to address the above impairments. Patients rehabilitation potential is considered to be Good     PLAN :  Recommendations and Planned Interventions:  Continue w/ puree diet. Upgrade to nectar thick liquids. Recommend MBS to be completed for possible diet advancement prior to discharge to nursing home. Aspiration precautions. Frequency/Duration: Patient will be followed by speech-language pathology 3 times a week to address goals. Discharge Recommendations: nursing home      SUBJECTIVE:   Patient resting soundly at bedside. Patient does alert slowly to verbal cues. OBJECTIVE:     CXR Results  (Last 48 hours)                 04/15/21 1512  XR CHEST PORT Final result    Impression:  1. There is increasing discoid atelectasis within the lung bases. This could be   secondary to limited inspiratory effort and/or mucous plugging or bronchospasm. 2.  The patient has undergone interval vertebroplasty procedure of a lower   thoracic vertebral body apparently representing the T10 vertebra. 3.  The remainder of this examination is unchanged. Narrative:  Reason for Visit:    HISTORY: Shortness of breath. TECHNIQUE: Portable AP radiograph the chest dated 4/15/2021 obtained at 3:08 PM.   COMPARISON: 4/11/2021. LIMITATIONS: The patient rotation to the right. TUBES/LINES:  None. HEART AND PULMONARY VESSELS: There is a moderate enlargement of the cardiac   silhouette. The pulmonary vessels are normal in caliber without vascular   congestion or pulmonary interstitial edema. LUNG PARENCHYMA: There are new discoid parenchymal opacities within the right   lung base and left midlung zone. There is an elevation of the hemidiaphragms. These findings are related to discoid atelectasis. This examination is negative   for lobar or segmental consolidation to suggest a bacterial pneumonia. PLEURA: The costophrenic angles are preserved without pleural effusions or a   pneumothorax. MEDIASTINUM: There is prominent atherosclerotic vascular disease with ectasia of   the thoracic aorta more pronounced than demonstrated on prior imaging due to   patient rotation. BONE/SOFT TISSUES: The patient is status post previous vertebral plasty at her   T10 level. The patient is also status post an open reduction and and internal   fixation procedure of a proximal left humeral fracture.                      CT Results  (Last 48 hours)      None             Past Medical History:   Diagnosis Date    Dementia ([address]     Gastrointestinal disorder     constipation    Heart failure ([address]     Hypertension     Psychiatric disorder     anxiety     Past Surgical History:   Procedure Laterality Date    IR KYPHOPLASTY THORACIC  4/14/2021     Prior Level of Function/Home Situation: unknown  Home Situation  Home Environment: Assisted living  Care Facility [facility]: [facility]  Living Alone: No  Support Systems: Assisted living  Diet prior to admission: puree, honey   Current Diet:  puree, honey    Cognitive and Communication Status:  Neurologic State: Alert  Orientation Level: Oriented to person  Cognition: Decreased attention/concentration, Decreased command following, Impaired decision making           Swallowing Evaluation:   Oral Assessment:  Oral Assessment  Dentition: Upper dentures  Lingual: No impairment  P.O. Trials:  Patient Position: upright in bed  Vocal quality prior to P.O.: No impairment  Consistency Presented: Nectar thick liquid;Puree; Solid  How Presented: Cup/sip;SLP-fed/presented;Self-fed/presented     Bolus Acceptance: No impairment  Bolus Formation/Control: No impairment     Propulsion: No impairment  Oral Residue: None  Initiation of Swallow: No impairment  Laryngeal Elevation: Functional  Aspiration Signs/Symptoms: None  Pharyngeal Phase Characteristics: No impairment, issues, or problems         Oral Phase Severity: Minimal  Pharyngeal Phase Severity : No impairment  Voice:     Vocal Quality: No impairment          Pain:  Pain Scale 1: Visual  Pain Intensity 1: 0       After treatment:   Patient left in no apparent distress in bed, Call bell within reach, and Nursing notified    COMMUNICATION/EDUCATION:   Patient was educated regarding purpose of SLP assessment, POC, diet recs and sw safety precautions. Patient demonstrated [address] understanding as evidenced by impaired cognition. The patient's plan of care including recommendations, planned interventions, and recommended diet changes were discussed with: Registered nurse. Patient/family have participated as able in goal setting and plan of care.     Thank you for this referral.  Abimael Perez, SLP M.S. CCC-SLP  Time Calculation: 15 mins           Problem: Dysphagia (Adult)  Goal: *Acute Goals and Plan of Care (Insert Text)  Description: Speech Therapy Swallow Goals  Initiated 4/16/2021  -Patient will tolerate puree diet with nectar thick liquids without clinical indicators of aspiration given minimal cues within 5-7 day(s). [ ] Not met  [ ]  MET   [ ] Progressing  [ ] Kristy Falcon  -Patient will tolerate PO trials without clinical indicators of aspiration given no cues within 5-7 day(s). [ ] Not met  [ ]  MET   [ ] Progressing  [ ] Kristy Falcon  -Patient will participate in modified barium swallow study within 3-4 day(s). [ ] Not met  [ ]  MET   [ ] Progressing  [ ] Kristy Falcon  -Patient will demonstrate understanding of swallow safety precautions and aspiration precautions, diet recs with no cues within 5-7 day(s).         [ ] Not met  [ ]  MET   [ ] Progressing  [ ] Discontinue     Outcome: Not Progressing Towards Goal

## 2021-04-17 LAB
ALBUMIN SERPL-MCNC: 2.6 G/DL (ref 3.5–5)
ALBUMIN/GLOB SERPL: 0.6 {RATIO} (ref 1.1–2.2)
ALP SERPL-CCNC: 106 U/L (ref 45–117)
ALT SERPL-CCNC: 15 U/L (ref 12–78)
ANION GAP SERPL CALC-SCNC: 6 MMOL/L (ref 5–15)
AST SERPL W P-5'-P-CCNC: 14 U/L (ref 15–37)
BASOPHILS # BLD: 0 K/UL (ref 0–0.1)
BASOPHILS NFR BLD: 0 % (ref 0–1)
BILIRUB SERPL-MCNC: 0.3 MG/DL (ref 0.2–1)
BUN SERPL-MCNC: 29 MG/DL (ref 6–20)
BUN/CREAT SERPL: 33 (ref 12–20)
CA-I BLD-MCNC: 9.3 MG/DL (ref 8.5–10.1)
CHLORIDE SERPL-SCNC: 102 MMOL/L (ref 97–108)
CO2 SERPL-SCNC: 29 MMOL/L (ref 21–32)
CREAT SERPL-MCNC: 0.89 MG/DL (ref 0.55–1.02)
DIFFERENTIAL METHOD BLD: ABNORMAL
EOSINOPHIL # BLD: 0.4 K/UL (ref 0–0.4)
EOSINOPHIL NFR BLD: 5 % (ref 0–7)
ERYTHROCYTE [DISTWIDTH] IN BLOOD BY AUTOMATED COUNT: 14.1 % (ref 11.5–14.5)
GLOBULIN SER CALC-MCNC: 4.1 G/DL (ref 2–4)
GLUCOSE SERPL-MCNC: 100 MG/DL (ref 65–100)
HCT VFR BLD AUTO: 35.3 % (ref 35–47)
HGB BLD-MCNC: 11.2 G/DL (ref 11.5–16)
IMM GRANULOCYTES # BLD AUTO: 0 K/UL (ref 0–0.04)
IMM GRANULOCYTES NFR BLD AUTO: 1 % (ref 0–0.5)
LYMPHOCYTES # BLD: 1.6 K/UL (ref 0.8–3.5)
LYMPHOCYTES NFR BLD: 20 % (ref 12–49)
MCH RBC QN AUTO: 31.5 PG (ref 26–34)
MCHC RBC AUTO-ENTMCNC: 31.7 G/DL (ref 30–36.5)
MCV RBC AUTO: 99.4 FL (ref 80–99)
MONOCYTES # BLD: 1 K/UL (ref 0–1)
MONOCYTES NFR BLD: 12 % (ref 5–13)
NEUTS SEG # BLD: 5.2 K/UL (ref 1.8–8)
NEUTS SEG NFR BLD: 62 % (ref 32–75)
PLATELET # BLD AUTO: 271 K/UL (ref 150–400)
PMV BLD AUTO: 9.7 FL (ref 8.9–12.9)
POTASSIUM SERPL-SCNC: 4.4 MMOL/L (ref 3.5–5.1)
PROT SERPL-MCNC: 6.7 G/DL (ref 6.4–8.2)
RBC # BLD AUTO: 3.55 M/UL (ref 3.8–5.2)
SODIUM SERPL-SCNC: 137 MMOL/L (ref 136–145)
WBC # BLD AUTO: 8.2 K/UL (ref 3.6–11)

## 2021-04-17 PROCEDURE — 74011250636 HC RX REV CODE- 250/636: Performed by: PHYSICIAN ASSISTANT

## 2021-04-17 PROCEDURE — 36415 COLL VENOUS BLD VENIPUNCTURE: CPT

## 2021-04-17 PROCEDURE — 74011000250 HC RX REV CODE- 250: Performed by: PHYSICIAN ASSISTANT

## 2021-04-17 PROCEDURE — 85025 COMPLETE CBC W/AUTO DIFF WBC: CPT

## 2021-04-17 PROCEDURE — 74011250637 HC RX REV CODE- 250/637: Performed by: PHYSICIAN ASSISTANT

## 2021-04-17 PROCEDURE — 97530 THERAPEUTIC ACTIVITIES: CPT

## 2021-04-17 PROCEDURE — 80053 COMPREHEN METABOLIC PANEL: CPT

## 2021-04-17 PROCEDURE — 65270000032 HC RM SEMIPRIVATE

## 2021-04-17 RX ADMIN — ACETAMINOPHEN 650 MG: 325 TABLET, FILM COATED ORAL at 12:01

## 2021-04-17 RX ADMIN — BUSPIRONE HYDROCHLORIDE 10 MG: 10 TABLET ORAL at 09:16

## 2021-04-17 RX ADMIN — SENNOSIDES AND DOCUSATE SODIUM 1 TABLET: 8.6; 5 TABLET ORAL at 20:18

## 2021-04-17 RX ADMIN — Medication 10 ML: at 14:30

## 2021-04-17 RX ADMIN — MIRTAZAPINE 7.5 MG: 15 TABLET, FILM COATED ORAL at 22:00

## 2021-04-17 RX ADMIN — TRAMADOL HYDROCHLORIDE 50 MG: 50 TABLET, FILM COATED ORAL at 22:52

## 2021-04-17 RX ADMIN — BUSPIRONE HYDROCHLORIDE 10 MG: 10 TABLET ORAL at 20:07

## 2021-04-17 RX ADMIN — CEFTRIAXONE SODIUM 2 G: 2 INJECTION, POWDER, FOR SOLUTION INTRAMUSCULAR; INTRAVENOUS at 14:28

## 2021-04-17 RX ADMIN — GABAPENTIN 100 MG: 100 CAPSULE ORAL at 09:16

## 2021-04-17 RX ADMIN — CHOLECALCIFEROL TAB 125 MCG (5000 UNIT) 5000 UNITS: 125 TAB at 09:16

## 2021-04-17 RX ADMIN — CARVEDILOL 3.12 MG: 3.12 TABLET, FILM COATED ORAL at 09:16

## 2021-04-17 RX ADMIN — Medication 10 ML: at 06:00

## 2021-04-17 RX ADMIN — SPIRONOLACTONE 25 MG: 25 TABLET ORAL at 09:16

## 2021-04-17 RX ADMIN — SENNOSIDES AND DOCUSATE SODIUM 1 TABLET: 8.6; 5 TABLET ORAL at 22:00

## 2021-04-17 RX ADMIN — MIRTAZAPINE 15 MG: 15 TABLET, FILM COATED ORAL at 20:19

## 2021-04-17 RX ADMIN — CARVEDILOL 3.12 MG: 3.12 TABLET, FILM COATED ORAL at 20:08

## 2021-04-17 RX ADMIN — FAMOTIDINE 20 MG: 20 TABLET ORAL at 09:16

## 2021-04-17 RX ADMIN — ATORVASTATIN CALCIUM 10 MG: 10 TABLET, FILM COATED ORAL at 09:16

## 2021-04-17 RX ADMIN — FAMOTIDINE 20 MG: 20 TABLET ORAL at 20:17

## 2021-04-17 NOTE — PROGRESS NOTES
Problem: Self Care Deficits Care Plan (Adult)  Goal: *Acute Goals and Plan of Care (Insert Text)  Description: Pt will be MI sup<->sit in prep for EOB ADL's  Pt will be min A  LB dressing EOB level  Pt will be MI  grooming EOB level  Pt will be MI  sit<-> prep for toilet transfer  Pt will be MI  BSC transfer with LRAD  Pt will be MI  toileting/cloth mgmt LRAD  Pt will be MI  grooming standing sink  Pt will be MI bathing sitting/standing sink LRAD  Pt will be MI felix UE HEP in prep for self care tasks      Outcome: Progressing Towards Goal     Problem: Patient Education: Go to Patient Education Activity  Goal: Patient/Family Education  Outcome: Progressing Towards Goal   OCCUPATIONAL THERAPY TREATMENT  Patient: Liliam Kaiser (13 y.o. female)  Date: 4/17/2021  Diagnosis: Compression fracture of body of thoracic vertebra ([facility] [address] [S22.000A] <principal problem not specified>       Precautions:    Chart, occupational therapy assessment, plan of care, and goals were reviewed. ASSESSMENT  Patient continues with skilled OT services and is progressing towards goals. Patient received supine in bed complaining of back pain and extremely [place] making directions of task hard. Patient completed bed mobility with Mod Ax1-2 to sit EOB with patient using single UE to pull self in therapists hands and still requiring boost for legs and UEs from behind. To scoot to EOB, patient required min A with visual cues. Handed patient socks to have patient martin them with patient attempting to martin but unable and stating \"you help me\". With max A, donned patient socks while she sat EOB. Patient attempted sit to stands x2 mod Ax2 but patient never able to fully stand attempting to sit due to back pain. Patient stated she wanted to lay back down. With mod Ax2, patient went from sitting EOB to supine requiring assistance with scooting towards HOB.  Patient left in sidelying position with wedge placed beneath her R side and heels elevated with call bell within reach. Current Level of Function Impacting Discharge (ADLs): pain levels    Other factors to consider for discharge: staff and caregiver support         PLAN :  Patient continues to benefit from skilled intervention to address the above impairments. Continue treatment per established plan of care. to address goals. Recommend next OT session: Standing and transfers with Ax2 in preparation for ADL tasks    Recommendation for discharge: (in order for the patient to meet his/her long term goals)  Therapy up to 5 days/week in SNF setting    This discharge recommendation:  Has been made in collaboration with the attending provider and/or case management    IF patient discharges home will need the following DME: gait belt, hospital bed, and walker: standard       SUBJECTIVE:   Patient stated my back hurts.     OBJECTIVE DATA SUMMARY:   Cognitive/Behavioral Status:  Neurologic State: Alert(Simultaneous filing. User may not have seen previous data.)  Orientation Level: Oriented to person  Cognition: Follows commands(Simultaneous filing. User may not have seen previous data.)      Functional Mobility and Transfers for ADLs:  Bed Mobility:  Rolling: Minimum assistance;Assist x1  Supine to Sit: Moderate assistance;Assist x1  Sit to Supine: Moderate assistance;Assist x1  Scooting: Minimum assistance;Assist x1    Transfers:  Sit to Stand: Moderate assistance;Assist x2      Balance:  Sitting: Intact; With support  Standing: Impaired;Pull to stand; With support  Standing - Static: Fair;Constant support  Standing - Dynamic : Poor;Constant support    ADL Intervention:  Lower Body Dressing Assistance  Socks: Maximum assistance      Pain:  Never gave number but stated she had back pain    Activity Tolerance:   Poor  Please refer to the flowsheet for vital signs taken during this treatment.     After treatment patient left in no apparent distress:   Supine in bed, Heels elevated for pressure relief, Call bell within reach, and Bed / chair alarm activated    COMMUNICATION/COLLABORATION:   The patients plan of care was discussed with: Physical therapy assistant.    Co treatment provided with PTA this date for increased clinician and patient safety    Gloria Kowalski  Time Calculation: 14 mins

## 2021-04-17 NOTE — PROGRESS NOTES
Hospitalist Progress Note    Subjective:   Daily Progress Note: 4/17/2021 8:42 AM    Hospital Course:   Patient is a 80 y. o. female who presented to the emergency room on 4/11/2021 from [address] for vague pain in her left side.  Patient has a history of congestive heart failure, dementia, hypertension, anxiety, constipation.  Patient is a poor historian due to the dementia.   Per [address] patient has not had any recent falls or injuries. Rosa Turner over the last few days she has had decreased activity and complaining of left-sided pain.  CT of the head showed no acute abnormality but cortical atrophy with chronic small vessel disease.  CT of the lumbar spine showed chronic compression fractures of L2 and L5, compression fractures of L3 and L4 age-indeterminate.  Posterior vertebral body cortical buckling at the L4 level with severe spinal canal narrowing.  CT of the thoracic spine showed multiple compression fractures at T4, T7, T8 which appear chronic. Ortho and IR consulted. MRI of the lumbar spine shows L2-L5 compression fracture chronic.  Posterior cortical buckling at the L4 fracture in conjunction with degenerative changes are present with severe spinal canal narrowing.  Neuroforaminal narrowing is significant severe of the right L4-L5 region.   MRI of the thoracic spine reveals a T10 inferior endplate acute compression fracture with bone marrow edema present.  Mild edema consistent with subacute fracture of T6, T8 and T12.  Moderate to severe spinal cord narrowing of T9 and T10. . Per POA she has had a mulitple falls over the past few months. Kyphoplasty T10 performed with no complications. Pain improved. Ambulating with PT/OT. Restarted home diet of honey thick fluids rather than thin liquids. CXR with no acute process. Atelectasis present. Discussed discharge with patient power of  Tina Olmos. No new medications      Subjective: Thoracic back pain improved.  Hard of hearing    Current Facility-Administered Medications   Medication Dose Route Frequency    cefTRIAXone (ROCEPHIN) 2 g in sterile water (preservative free) 20 mL IV syringe  2 g IntraVENous Q24H    atorvastatin (LIPITOR) tablet 10 mg  10 mg Oral DAILY    busPIRone (BUSPAR) tablet 10 mg  10 mg Oral BID    carvediloL (COREG) tablet 3.125 mg  3.125 mg Oral BID    cholecalciferol (VITAMIN D3) (5000 Units/125 mcg) tablet 5,000 Units  5,000 Units Oral DAILY    gabapentin (NEURONTIN) capsule 100 mg  100 mg Oral DAILY    mirtazapine (REMERON) tablet 7.5 mg  7.5 mg Oral QHS    senna-docusate (PERICOLACE) 8.6-50 mg per tablet 1 Tab  1 Tab Oral QHS    spironolactone (ALDACTONE) tablet 25 mg  25 mg Oral DAILY    sodium chloride (NS) flush 5-40 mL  5-40 mL IntraVENous Q8H    sodium chloride (NS) flush 5-40 mL  5-40 mL IntraVENous PRN    acetaminophen (TYLENOL) tablet 650 mg  650 mg Oral Q6H PRN    Or    acetaminophen (TYLENOL) suppository 650 mg  650 mg Rectal Q6H PRN    ondansetron (ZOFRAN ODT) tablet 4 mg  4 mg Oral Q8H PRN    Or    ondansetron (ZOFRAN) injection 4 mg  4 mg IntraVENous Q6H PRN    famotidine (PEPCID) tablet 20 mg  20 mg Oral BID    polyethylene glycol (MIRALAX) packet 17 g  17 g Oral DAILY PRN    traMADoL (ULTRAM) tablet 50 mg  50 mg Oral Q6H PRN        Review of Systems  Constitutional: No fevers, No chills, No sweats, + fatigue,+ Weakness  Eyes: No redness  Ears, nose, mouth, throat, and face: No nasal congestion, No sore throat, No voice change  Respiratory: No Shortness of Breath, No cough, No wheezing  Cardiovascular: No chest pain, No palpitations, No extremity edema  Gastrointestinal: No nausea, No vomiting, No diarrhea, No abdominal pain  Genitourinary: No frequency, No dysuria, No hematuria  Integument/breast: No skin lesion(s)   Neurological: +Confusion, No headaches, No dizziness      Objective:     Visit Vitals  /65   Pulse 79   Temp 96.9 °F (36.1 °C)   Resp 16   Ht 5' 3\" (1.6 m)   SpO2 92% Breastfeeding No   BMI 22.00 kg/m²      O2 Device: None (Room air)    Temp (24hrs), Av °F (36.7 °C), Min:96.9 °F (36.1 °C), Max:99.2 °F (37.3 °C)      No intake/output data recorded. 04/15 1901 -  0700  In: 240 [P.O.:240]  Out: 950 [Urine:950]    PHYSICAL EXAM:  Constitutional: elderly appearing, No acute distress  Skin: Extremities and face reveal no rashes. HEENT: Sclerae anicteric. Extra-occular muscles are intact. No oral ulcers. Cardiovascular: Regular rate and rhythm. Respiratory:  Clear breath sounds bilaterally with no wheezes, rales, or rhonchi. GI: Abdomen nondistended, soft, and nontender. Normal active bowel sounds. Musculoskeletal: No pitting edema of the lower legs. Able to move all ext. Proximal lumbar spine back pain 2+  Neurological:  Patient is alert and oriented. Cranial nerves II-XII grossly intact  Psychiatric: Mood appears appropriate       Data Review    Recent Results (from the past 24 hour(s))   CBC WITH AUTOMATED DIFF    Collection Time: 21  6:20 AM   Result Value Ref Range    WBC 8.2 3.6 - 11.0 K/uL    RBC 3.55 (L) 3.80 - 5.20 M/uL    HGB 11.2 (L) 11.5 - 16.0 g/dL    HCT 35.3 35.0 - 47.0 %    MCV 99.4 (H) 80.0 - 99.0 FL    MCH 31.5 26.0 - 34.0 PG    MCHC 31.7 30.0 - 36.5 g/dL    RDW 14.1 11.5 - 14.5 %    PLATELET 204 255 - 391 K/uL    MPV 9.7 8.9 - 12.9 FL    NEUTROPHILS 62 32 - 75 %    LYMPHOCYTES 20 12 - 49 %    MONOCYTES 12 5 - 13 %    EOSINOPHILS 5 0 - 7 %    BASOPHILS 0 0 - 1 %    IMMATURE GRANULOCYTES 1 (H) 0.0 - 0.5 %    ABS. NEUTROPHILS 5.2 1.8 - 8.0 K/UL    ABS. LYMPHOCYTES 1.6 0.8 - 3.5 K/UL    ABS. MONOCYTES 1.0 0.0 - 1.0 K/UL    ABS. EOSINOPHILS 0.4 0.0 - 0.4 K/UL    ABS. BASOPHILS 0.0 0.0 - 0.1 K/UL    ABS. IMM.  GRANS. 0.0 0.00 - 0.04 K/UL    DF AUTOMATED     METABOLIC PANEL, COMPREHENSIVE    Collection Time: 21  6:20 AM   Result Value Ref Range    Sodium 137 136 - 145 mmol/L    Potassium 4.4 3.5 - 5.1 mmol/L    Chloride 102 97 - 108 mmol/L CO2 29 21 - 32 mmol/L    Anion gap 6 5 - 15 mmol/L    Glucose 100 65 - 100 mg/dL    BUN 29 (H) 6 - 20 mg/dL    Creatinine 0.89 0.55 - 1.02 mg/dL    BUN/Creatinine ratio 33 (H) 12 - 20      GFR est AA >60 >60 ml/min/1.73m2    GFR est non-AA 59 (L) >60 ml/min/1.73m2    Calcium 9.3 8.5 - 10.1 mg/dL    Bilirubin, total 0.3 0.2 - 1.0 mg/dL    AST (SGOT) 14 (L) 15 - 37 U/L    ALT (SGPT) 15 12 - 78 U/L    Alk. phosphatase 106 45 - 117 U/L    Protein, total 6.7 6.4 - 8.2 g/dL    Albumin 2.6 (L) 3.5 - 5.0 g/dL    Globulin 4.1 (H) 2.0 - 4.0 g/dL    A-G Ratio 0.6 (L) 1.1 - 2.2         Radiology review: CT lumbar and thoracic spine    Assessment:   1. Multiple lumbar and thoracic compression fractures  2. Dementia without behavioral disturbances  3. History of CHF  4. Hypertension  5. Constipation  6. History of RA  7. Acute cystitis    Plan:    1. MRI of the T- spine acute fracture with bone marrow edema. MRI of the lumbar spine shows L2-L5 compression fracture chronic. Posterior cortical buckling at the L4 fracture in conjunction with degenerative changes are present with severe spinal canal narrowing. Neuroforaminal narrowing is significant severe of the right L4-L5 region. MRI of the thoracic spine reveals a T10 inferior endplate acute compression fracture with bone marrow edema present. Mild edema consistent with subacute fracture of T6, T8 and T12. Moderate to severe spinal cord narrowing of T9 and T10. Interventional radiology and orthopedics consulted. Tylenol, tramadol, morphine as needed for pain. She normally takes aspirin and Plavix   Kyphoplasty T10 4/14. 2.  Continue with Remeron  3. Blood pressure stable. Continue with Coreg. 4.  On stool softener  5. CBC CMP in a.m.  6.  Case management for discharge planning  7. We will consult PT and OT after orthopedic and interventional radiology evaluation  8.   Acute cystitis  Continue Rocephin  Urine culture pending  Discharge home when urine culture sensitivities are available      ALISSA Lizbeth Palencia - Cell [phone], home [phone] --> updated on 4/17/2021      CODE STATUS DNR     DVT prophylaxis: SCDs  Ulcer prophylaxis: [address] discussed with: Patient/Family, Nurse and     Total time spent with patient: >35 minutes.

## 2021-04-17 NOTE — PROGRESS NOTES
Problem: Mobility Impaired (Adult and Pediatric)  Goal: *Acute Goals and Plan of Care (Insert Text)  Description: Pt will be I with LE HEP in 7 days. Pt will perform bed mobility with mod I in 7 days. Pt will perform transfers with mod I in 7 days. Pt will amb 10-25 feet with LRAD safely with mod I in 7 days. Outcome: Progressing Towards Goal   PHYSICAL THERAPY TREATMENT  Patient: Daniela Solis (47 y.o. female)  Date: 4/17/2021  Diagnosis: Compression fracture of body of thoracic vertebra ([address] [S22.000A] <principal problem not specified>       Precautions:    Chart, physical therapy assessment, plan of care and goals were reviewed. ASSESSMENT  Patient continues with skilled PT services and is progressing towards goals. Co-treat with OT due to level of assist needed, age, extremely [place] and severe back pain. Upon entry into room, patient sleeping and only aroused by tactile cue at arm, which startled here. Must use hand gestures to communicate as she cannot hear well at all. She continually stated, \"Ow, my back hurts. \" Motioned for patient to sit up. Supine<>sit with modA x 2 and patient crying out in pain the entire time. She sat EOB for a few minutes and then performed 2x sit<>stand with modA x 2 but unable to come fully erect due to severe back pain despite cueing for upright posture. Returned patient to semi-supine and Darius to roll to L side to position wedge under R side so patient was left in 3/4 S/L on L side. Patient stated that she wanted to sleep. She will benefit from continued skilled PT services to improve her strength for transfers and tolerance to physical activity. Co-treat due to level of assist needed for bed mobility and OOB transfers 2/2 generalized weakness and severe back pain as well as hearing deficits for patient/clinician safety.      Current Level of Function Impacting Discharge (mobility/balance): decr mobility, decr sitting tolerance, decr standing tolerance    Other factors to consider for discharge: severely [place], severe back pain, age, decr cognition          PLAN :  Patient continues to benefit from skilled intervention to address the above impairments. Continue treatment per established plan of care. to address goals. Recommendation for discharge: (in order for the patient to meet his/her long term goals)  To be determined: Patient to return to [address] ([place]) and receive HHPT there for continued rehab     This discharge recommendation:  Has been made in collaboration with the attending provider and/or case management    IF patient discharges home will need the following DME: rolling walker and to be determined (TBD)       SUBJECTIVE:   Patient stated my back really hurts.     OBJECTIVE DATA SUMMARY:   Critical Behavior:  Neurologic State: Alert(Simultaneous filing. User may not have seen previous data.)  Orientation Level: Oriented to person  Cognition: Follows commands(Simultaneous filing. User may not have seen previous data.)     Functional Mobility Training:  Bed Mobility:  Rolling: Minimum assistance;Assist x1  Supine to Sit: Moderate assistance;Assist x1  Sit to Supine: Moderate assistance;Assist x1  Scooting: Minimum assistance;Assist x1   Increased time to come to sitting EOB due to weakness and severe back pain as well as time for visual cueing 2/2 hearing loss     Transfers:  Sit to Stand: Moderate assistance;Assist x2  Stand to Sit: Moderate assistance;Assist x2  Increased time to perform. Visual cueing required      Balance:  Sitting: Intact; With support  Standing: Impaired;Pull to stand; With support  Standing - Static: Fair;Constant support  Standing - Dynamic : Poor;Constant support        Therapeutic Exercises:   Not today   Pain Rating:  No number given since patient [place] but she appeared to have a lot of back pain     Activity Tolerance:   Poor and requires frequent rest breaks  Please refer to the flowsheet for vital signs taken during this treatment. After treatment patient left in no apparent distress:   Supine in bed, Patient positioned in L sidelying for pressure relief, Call bell within reach, Bed / chair alarm activated, and nursing aware of patient participation with therapy and positioning at end of session     COMMUNICATION/COLLABORATION:   The patients plan of care was discussed with: Physical therapist, Occupational therapy assistant, and Registered nurse.      Spring Reyes   Time Calculation: 14 mins

## 2021-04-18 LAB
ALBUMIN SERPL-MCNC: 2.7 G/DL (ref 3.5–5)
ALBUMIN/GLOB SERPL: 0.7 {RATIO} (ref 1.1–2.2)
ALP SERPL-CCNC: 103 U/L (ref 45–117)
ALT SERPL-CCNC: 13 U/L (ref 12–78)
ANION GAP SERPL CALC-SCNC: 5 MMOL/L (ref 5–15)
AST SERPL W P-5'-P-CCNC: 12 U/L (ref 15–37)
BACTERIA SPEC CULT: ABNORMAL
BILIRUB SERPL-MCNC: 0.4 MG/DL (ref 0.2–1)
BUN SERPL-MCNC: 24 MG/DL (ref 6–20)
BUN/CREAT SERPL: 30 (ref 12–20)
CA-I BLD-MCNC: 9.3 MG/DL (ref 8.5–10.1)
CHLORIDE SERPL-SCNC: 102 MMOL/L (ref 97–108)
CO2 SERPL-SCNC: 31 MMOL/L (ref 21–32)
COLONY COUNT,CNT: 1000
COLONY COUNT,CNT: ABNORMAL
CREAT SERPL-MCNC: 0.79 MG/DL (ref 0.55–1.02)
GLOBULIN SER CALC-MCNC: 4 G/DL (ref 2–4)
GLUCOSE SERPL-MCNC: 89 MG/DL (ref 65–100)
POTASSIUM SERPL-SCNC: 4 MMOL/L (ref 3.5–5.1)
PROT SERPL-MCNC: 6.7 G/DL (ref 6.4–8.2)
SODIUM SERPL-SCNC: 138 MMOL/L (ref 136–145)
SPECIAL REQUESTS,SREQ: ABNORMAL

## 2021-04-18 PROCEDURE — 36415 COLL VENOUS BLD VENIPUNCTURE: CPT

## 2021-04-18 PROCEDURE — 94760 N-INVAS EAR/PLS OXIMETRY 1: CPT

## 2021-04-18 PROCEDURE — 80053 COMPREHEN METABOLIC PANEL: CPT

## 2021-04-18 PROCEDURE — 74011250637 HC RX REV CODE- 250/637: Performed by: PHYSICIAN ASSISTANT

## 2021-04-18 PROCEDURE — 65270000032 HC RM SEMIPRIVATE

## 2021-04-18 PROCEDURE — 74011000250 HC RX REV CODE- 250: Performed by: PHYSICIAN ASSISTANT

## 2021-04-18 PROCEDURE — 74011250636 HC RX REV CODE- 250/636: Performed by: PHYSICIAN ASSISTANT

## 2021-04-18 PROCEDURE — 97530 THERAPEUTIC ACTIVITIES: CPT

## 2021-04-18 PROCEDURE — 85025 COMPLETE CBC W/AUTO DIFF WBC: CPT

## 2021-04-18 RX ADMIN — BUSPIRONE HYDROCHLORIDE 10 MG: 10 TABLET ORAL at 20:21

## 2021-04-18 RX ADMIN — TRAMADOL HYDROCHLORIDE 50 MG: 50 TABLET, FILM COATED ORAL at 05:29

## 2021-04-18 RX ADMIN — SENNOSIDES AND DOCUSATE SODIUM 1 TABLET: 8.6; 5 TABLET ORAL at 20:21

## 2021-04-18 RX ADMIN — SPIRONOLACTONE 25 MG: 25 TABLET ORAL at 07:22

## 2021-04-18 RX ADMIN — CARVEDILOL 3.12 MG: 3.12 TABLET, FILM COATED ORAL at 20:22

## 2021-04-18 RX ADMIN — MIRTAZAPINE 7.5 MG: 15 TABLET, FILM COATED ORAL at 20:21

## 2021-04-18 RX ADMIN — ATORVASTATIN CALCIUM 10 MG: 10 TABLET, FILM COATED ORAL at 07:22

## 2021-04-18 RX ADMIN — CARVEDILOL 3.12 MG: 3.12 TABLET, FILM COATED ORAL at 07:21

## 2021-04-18 RX ADMIN — BUSPIRONE HYDROCHLORIDE 10 MG: 10 TABLET ORAL at 07:22

## 2021-04-18 RX ADMIN — CHOLECALCIFEROL TAB 125 MCG (5000 UNIT) 5000 UNITS: 125 TAB at 07:21

## 2021-04-18 RX ADMIN — FAMOTIDINE 20 MG: 20 TABLET ORAL at 07:22

## 2021-04-18 RX ADMIN — GABAPENTIN 100 MG: 100 CAPSULE ORAL at 07:22

## 2021-04-18 RX ADMIN — FAMOTIDINE 20 MG: 20 TABLET ORAL at 20:22

## 2021-04-18 NOTE — PROGRESS NOTES
Assumed care at [facility] alert and oreiented x 2 communicated via copy paper. Followed simple commands and able to make need known. Pt tolerated meds with thicken liquid. Pur wick in place no pain. urine brownish clear 900 cc for shift. No acute distress noted HOB up patient rested with eyes closed most night call bell in reach bed alarm functional bed in low position.

## 2021-04-18 NOTE — PROGRESS NOTES
Problem: Mobility Impaired (Adult and Pediatric)  Goal: *Acute Goals and Plan of Care (Insert Text)  Description: Pt will be I with LE HEP in 7 days. Pt will perform bed mobility with mod I in 7 days. Pt will perform transfers with mod I in 7 days. Pt will amb 10-25 feet with LRAD safely with mod I in 7 days. Outcome: Progressing Towards Goal   PHYSICAL THERAPY TREATMENT  Patient: Lucious Schaumann (69 y.o. female)  Date: 4/18/2021  Diagnosis: Compression fracture of body of thoracic vertebra ([address] [S22.000A] <principal problem not specified>       Precautions:    Chart, physical therapy assessment, plan of care and goals were reviewed. ASSESSMENT  Patient continues with skilled PT services and is progressing towards goals. Decreased assistance required for bed mobility and transfers. Assistance of 1 this tx. Followed hand cues well, also communicated through written paper. Performed sup-sit transfer with min A of 1 when increased time was provided. Once at EOB, maintained sitting ~4min, SBA-CGA, educated on use of bed rails  for support. Sit<>stand from EOB light min A-CGA, maintained static stand ~1min, CGA with RW. Participated with few standing marches, however then pt began to have BM, assisted pt back to bed and placed bed pan under pt. Current Level of Function Impacting Discharge (mobility/balance): Limited overall mobility. Other factors to consider for discharge: Extreme [place]. PLAN :  Patient continues to benefit from skilled intervention to address the above impairments. Continue treatment per established plan of care. to address goals. Recommendation for discharge: (in order for the patient to meet his/her long term goals)  Physical therapy at least 2 days/week [facility] home at [facility]         SUBJECTIVE:   Patient shook head yes/no, however limited verbal responses given. Pt extreme [place].      OBJECTIVE DATA SUMMARY:   Critical Behavior:  Neurologic State: Alert  Orientation Level: Oriented to person, Oriented to place  Cognition: Appropriate for age attention/concentration, Follows commands     Functional Mobility Training:  Bed Mobility:  Rolling: Stand-by assistance  Supine to Sit: Minimum assistance  Sit to Supine: Stand-by assistance  Scooting: Moderate assistance        Transfers:  Sit to Stand: Assist x1;Minimum assistance  Stand to Sit: Assist x1;Contact guard assistance                             Balance:  Sitting: Intact; With support  Standing: With support  Standing - Static: Fair  Standing - Dynamic : Fair  Ambulation/Gait Training:  Participated with standing marches, limited distance secondary to having BM while in standing position. Pain Rating:  No pain reported this tx.      Activity Tolerance:   Fair      After treatment patient left in no apparent distress:   Supine in bed, Call bell within reach, and Bed / chair alarm activated    COMMUNICATION/COLLABORATION:        Tressa Donovan   Time Calculation: 16 mins

## 2021-04-18 NOTE — PROGRESS NOTES
Hospitalist Progress Note    Subjective:   Daily Progress Note: 4/18/2021     Hospital Course:   Patient is a 80 y. o. female who presented to the emergency room on 4/11/2021 from [address] for vague pain in her left side.  Patient has a history of congestive heart failure, dementia, hypertension, anxiety, constipation.  Patient is a poor historian due to the dementia.   Per [address] patient has not had any recent falls or injuries. [facility] over the last few days she has had decreased activity and complaining of left-sided pain.  CT of the head showed no acute abnormality but cortical atrophy with chronic small vessel disease.  CT of the lumbar spine showed chronic compression fractures of L2 and L5, compression fractures of L3 and L4 age-indeterminate.  Posterior vertebral body cortical buckling at the L4 level with severe spinal canal narrowing.  CT of the thoracic spine showed multiple compression fractures at T4, T7, T8 which appear chronic. Ortho and IR consulted. MRI of the lumbar spine shows L2-L5 compression fracture chronic.  Posterior cortical buckling at the L4 fracture in conjunction with degenerative changes are present with severe spinal canal narrowing.  Neuroforaminal narrowing is significant severe of the right L4-L5 region.   MRI of the thoracic spine reveals a T10 inferior endplate acute compression fracture with bone marrow edema present.  Mild edema consistent with subacute fracture of T6, T8 and T12.  Moderate to severe spinal cord narrowing of T9 and T10. . Per POA she has had a mulitple falls over the past few months. Kyphoplasty T10 performed with no complications. Pain improved. Ambulating with PT/OT. Restarted home diet of honey thick fluids rather than thin liquids. CXR with no acute process. Atelectasis present. Discussed discharge with patient [place] [facility]. No new medications      Subjective: Thoracic back pain improved.  Hard of hearing    Current Facility-Administered Medications   Medication Dose Route Frequency    cefTRIAXone (ROCEPHIN) 2 g in sterile water (preservative free) 20 mL IV syringe  2 g IntraVENous Q24H    atorvastatin (LIPITOR) tablet 10 mg  10 mg Oral DAILY    busPIRone (BUSPAR) tablet 10 mg  10 mg Oral BID    carvediloL (COREG) tablet 3.125 mg  3.125 mg Oral BID    cholecalciferol (VITAMIN D3) (5000 Units/125 mcg) tablet 5,000 Units  5,000 Units Oral DAILY    gabapentin (NEURONTIN) capsule 100 mg  100 mg Oral DAILY    mirtazapine (REMERON) tablet 7.5 mg  7.5 mg Oral QHS    senna-docusate (PERICOLACE) 8.6-50 mg per tablet 1 Tab  1 Tab Oral QHS    spironolactone (ALDACTONE) tablet 25 mg  25 mg Oral DAILY    sodium chloride (NS) flush 5-40 mL  5-40 mL IntraVENous PRN    acetaminophen (TYLENOL) tablet 650 mg  650 mg Oral Q6H PRN    Or    acetaminophen (TYLENOL) suppository 650 mg  650 mg Rectal Q6H PRN    ondansetron (ZOFRAN ODT) tablet 4 mg  4 mg Oral Q8H PRN    Or    ondansetron (ZOFRAN) injection 4 mg  4 mg IntraVENous Q6H PRN    famotidine (PEPCID) tablet 20 mg  20 mg Oral BID    polyethylene glycol (MIRALAX) packet 17 g  17 g Oral DAILY PRN    traMADoL (ULTRAM) tablet 50 mg  50 mg Oral Q6H PRN        Review of Systems  Constitutional: No fevers, No chills, No sweats, + fatigue,+ Weakness  Eyes: No redness  Ears, nose, mouth, throat, and face: No nasal congestion, No sore throat, No voice change  Respiratory: No Shortness of Breath, No cough, No wheezing  Cardiovascular: No chest pain, No palpitations, No extremity edema  Gastrointestinal: No nausea, No vomiting, No diarrhea, No abdominal pain  Genitourinary: No frequency, No dysuria, No hematuria  Integument/breast: No skin lesion(s)   Neurological: +Confusion, No headaches, No dizziness      Objective:     Visit Vitals  BP (!) 128/49   Pulse 68   Temp 97.2 °F (36.2 °C)   Resp 16   Ht 5' 3\" (1.6 m)   SpO2 96%   Breastfeeding No   BMI 22.00 kg/m²      O2 Device: None (Room air)    Temp (24hrs), Av.7 °F (36.5 °C), Min:97.2 °F (36.2 °C), Max:98.3 °F (36.8 °C)      No intake/output data recorded.  1901 -  0700  In: 240 [P.O.:240]  Out: 1600 [Urine:1600]    PHYSICAL EXAM:  Constitutional: elderly appearing, No acute distress  Skin: Extremities and face reveal no rashes. HEENT: Sclerae anicteric. Extra-occular muscles are intact. No oral ulcers. Cardiovascular: Regular rate and rhythm. Respiratory:  Clear breath sounds bilaterally with no wheezes, rales, or rhonchi. GI: Abdomen nondistended, soft, and nontender. Normal active bowel sounds. Musculoskeletal: No pitting edema of the lower legs. Able to move all ext. Proximal lumbar spine back pain 2+  Neurological:  Patient is alert and oriented. Cranial nerves II-XII grossly intact  Psychiatric: Mood appears appropriate       Data Review    Recent Results (from the past 24 hour(s))   METABOLIC PANEL, COMPREHENSIVE    Collection Time: 21  5:58 AM   Result Value Ref Range    Sodium 138 136 - 145 mmol/L    Potassium 4.0 3.5 - 5.1 mmol/L    Chloride 102 97 - 108 mmol/L    CO2 31 21 - 32 mmol/L    Anion gap 5 5 - 15 mmol/L    Glucose 89 65 - 100 mg/dL    BUN 24 (H) 6 - 20 mg/dL    Creatinine 0.79 0.55 - 1.02 mg/dL    BUN/Creatinine ratio 30 (H) 12 - 20      GFR est AA >60 >60 ml/min/1.73m2    GFR est non-AA >60 >60 ml/min/1.73m2    Calcium 9.3 8.5 - 10.1 mg/dL    Bilirubin, total 0.4 0.2 - 1.0 mg/dL    AST (SGOT) 12 (L) 15 - 37 U/L    ALT (SGPT) 13 12 - 78 U/L    Alk. phosphatase 103 45 - 117 U/L    Protein, total 6.7 6.4 - 8.2 g/dL    Albumin 2.7 (L) 3.5 - 5.0 g/dL    Globulin 4.0 2.0 - 4.0 g/dL    A-G Ratio 0.7 (L) 1.1 - 2.2         Radiology review: CT lumbar and thoracic spine    Assessment:   1. Multiple lumbar and thoracic compression fractures  2. Dementia without behavioral disturbances  3. History of CHF  4. Hypertension  5. Constipation  6. History of RA  7. Acute cystitis    Plan:    1.   MRI of the T- spine acute fracture with bone marrow edema. MRI of the lumbar spine shows L2-L5 compression fracture chronic. Posterior cortical buckling at the L4 fracture in conjunction with degenerative changes are present with severe spinal canal narrowing. Neuroforaminal narrowing is significant severe of the right L4-L5 region. MRI of the thoracic spine reveals a T10 inferior endplate acute compression fracture with bone marrow edema present. Mild edema consistent with subacute fracture of T6, T8 and T12. Moderate to severe spinal cord narrowing of T9 and T10. Interventional radiology and orthopedics consulted. Tylenol, tramadol, morphine as needed for pain. She normally takes aspirin and Plavix   Kyphoplasty T10 4/14. 2.  Continue with Remeron  3. Blood pressure stable. Continue with Coreg. 4.  On stool softener  5. CBC CMP in a.m.  6.  Case management for discharge planning  7. We will consult PT and OT after orthopedic and interventional radiology evaluation  8. Acute cystitis  Continue Rocephin  Urine culture Gram negative rods  Discharge home when urine culture sensitivities are available      ALISSA Jimenez - Cell [phone], home [phone] --> updated on 4/17/2021      CODE STATUS DNR     DVT prophylaxis: SCDs  Ulcer prophylaxis: [address] discussed with: Patient/Family, Nurse and     Total time spent with patient: >35 minutes.

## 2021-04-19 VITALS
DIASTOLIC BLOOD PRESSURE: 64 MMHG | HEIGHT: 63 IN | BODY MASS INDEX: 22 KG/M2 | OXYGEN SATURATION: 95 % | SYSTOLIC BLOOD PRESSURE: 114 MMHG | RESPIRATION RATE: 17 BRPM | TEMPERATURE: 96.2 F | HEART RATE: 86 BPM

## 2021-04-19 PROBLEM — N30.00 ACUTE CYSTITIS: Status: ACTIVE | Noted: 2021-04-19

## 2021-04-19 LAB
ALBUMIN SERPL-MCNC: 2.8 G/DL (ref 3.5–5)
ALBUMIN/GLOB SERPL: 0.6 {RATIO} (ref 1.1–2.2)
ALP SERPL-CCNC: 111 U/L (ref 45–117)
ALT SERPL-CCNC: 16 U/L (ref 12–78)
ANION GAP SERPL CALC-SCNC: 4 MMOL/L (ref 5–15)
AST SERPL W P-5'-P-CCNC: 13 U/L (ref 15–37)
BASOPHILS # BLD: 0 K/UL (ref 0–0.1)
BASOPHILS # BLD: 0 K/UL (ref 0–0.1)
BASOPHILS NFR BLD: 0 % (ref 0–1)
BASOPHILS NFR BLD: 1 % (ref 0–1)
BILIRUB SERPL-MCNC: 0.5 MG/DL (ref 0.2–1)
BUN SERPL-MCNC: 20 MG/DL (ref 6–20)
BUN/CREAT SERPL: 21 (ref 12–20)
CA-I BLD-MCNC: 9.6 MG/DL (ref 8.5–10.1)
CHLORIDE SERPL-SCNC: 101 MMOL/L (ref 97–108)
CO2 SERPL-SCNC: 30 MMOL/L (ref 21–32)
CREAT SERPL-MCNC: 0.94 MG/DL (ref 0.55–1.02)
DIFFERENTIAL METHOD BLD: ABNORMAL
DIFFERENTIAL METHOD BLD: NORMAL
EOSINOPHIL # BLD: 0.3 K/UL (ref 0–0.4)
EOSINOPHIL # BLD: 0.5 K/UL (ref 0–0.4)
EOSINOPHIL NFR BLD: 4 % (ref 0–7)
EOSINOPHIL NFR BLD: 6 % (ref 0–7)
ERYTHROCYTE [DISTWIDTH] IN BLOOD BY AUTOMATED COUNT: 13.1 % (ref 11.5–14.5)
ERYTHROCYTE [DISTWIDTH] IN BLOOD BY AUTOMATED COUNT: 13.8 % (ref 11.5–14.5)
GLOBULIN SER CALC-MCNC: 4.5 G/DL (ref 2–4)
GLUCOSE SERPL-MCNC: 140 MG/DL (ref 65–100)
HCT VFR BLD AUTO: 37 % (ref 35–47)
HCT VFR BLD AUTO: 38.5 % (ref 35–47)
HGB BLD-MCNC: 11.2 G/DL (ref 11.5–16)
HGB BLD-MCNC: 12.6 G/DL (ref 11.5–16)
IMM GRANULOCYTES # BLD AUTO: 0 K/UL (ref 0–0.04)
IMM GRANULOCYTES # BLD AUTO: 0 K/UL (ref 0–0.04)
IMM GRANULOCYTES NFR BLD AUTO: 0 % (ref 0–0.5)
IMM GRANULOCYTES NFR BLD AUTO: 0 % (ref 0–0.5)
LYMPHOCYTES # BLD: 1.4 K/UL (ref 0.8–3.5)
LYMPHOCYTES # BLD: 1.7 K/UL (ref 0.8–3.5)
LYMPHOCYTES NFR BLD: 19 % (ref 12–49)
LYMPHOCYTES NFR BLD: 22 % (ref 12–49)
MCH RBC QN AUTO: 31.5 PG (ref 26–34)
MCH RBC QN AUTO: 32.3 PG (ref 26–34)
MCHC RBC AUTO-ENTMCNC: 30.3 G/DL (ref 30–36.5)
MCHC RBC AUTO-ENTMCNC: 32.7 G/DL (ref 30–36.5)
MCV RBC AUTO: 104.2 FL (ref 80–99)
MCV RBC AUTO: 98.7 FL (ref 80–99)
MONOCYTES # BLD: 0.8 K/UL (ref 0–1)
MONOCYTES # BLD: 0.9 K/UL (ref 0–1)
MONOCYTES NFR BLD: 10 % (ref 5–13)
MONOCYTES NFR BLD: 11 % (ref 5–13)
NEUTS SEG # BLD: 5 K/UL (ref 1.8–8)
NEUTS SEG # BLD: 5.2 K/UL (ref 1.8–8)
NEUTS SEG NFR BLD: 60 % (ref 32–75)
NEUTS SEG NFR BLD: 67 % (ref 32–75)
PLATELET # BLD AUTO: 289 K/UL (ref 150–400)
PLATELET # BLD AUTO: 297 K/UL (ref 150–400)
PMV BLD AUTO: 9.4 FL (ref 8.9–12.9)
PMV BLD AUTO: 9.8 FL (ref 8.9–12.9)
POTASSIUM SERPL-SCNC: 4.2 MMOL/L (ref 3.5–5.1)
PROT SERPL-MCNC: 7.3 G/DL (ref 6.4–8.2)
RBC # BLD AUTO: 3.55 M/UL (ref 3.8–5.2)
RBC # BLD AUTO: 3.9 M/UL (ref 3.8–5.2)
SODIUM SERPL-SCNC: 135 MMOL/L (ref 136–145)
WBC # BLD AUTO: 7.7 K/UL (ref 3.6–11)
WBC # BLD AUTO: 8.1 K/UL (ref 3.6–11)

## 2021-04-19 PROCEDURE — 80053 COMPREHEN METABOLIC PANEL: CPT

## 2021-04-19 PROCEDURE — 85025 COMPLETE CBC W/AUTO DIFF WBC: CPT

## 2021-04-19 PROCEDURE — 97530 THERAPEUTIC ACTIVITIES: CPT

## 2021-04-19 PROCEDURE — 74011250637 HC RX REV CODE- 250/637: Performed by: PHYSICIAN ASSISTANT

## 2021-04-19 PROCEDURE — 36415 COLL VENOUS BLD VENIPUNCTURE: CPT

## 2021-04-19 RX ORDER — CIPROFLOXACIN 500 MG/1
500 TABLET ORAL 2 TIMES DAILY
Qty: 10 TAB | Refills: 0 | Status: SHIPPED | OUTPATIENT
Start: 2021-04-19

## 2021-04-19 RX ADMIN — BUSPIRONE HYDROCHLORIDE 10 MG: 10 TABLET ORAL at 09:51

## 2021-04-19 RX ADMIN — CARVEDILOL 3.12 MG: 3.12 TABLET, FILM COATED ORAL at 09:51

## 2021-04-19 RX ADMIN — CHOLECALCIFEROL TAB 125 MCG (5000 UNIT) 5000 UNITS: 125 TAB at 09:51

## 2021-04-19 RX ADMIN — SPIRONOLACTONE 25 MG: 25 TABLET ORAL at 09:51

## 2021-04-19 RX ADMIN — GABAPENTIN 100 MG: 100 CAPSULE ORAL at 09:51

## 2021-04-19 RX ADMIN — FAMOTIDINE 20 MG: 20 TABLET ORAL at 09:51

## 2021-04-19 RX ADMIN — ATORVASTATIN CALCIUM 10 MG: 10 TABLET, FILM COATED ORAL at 09:51

## 2021-04-19 NOTE — PROGRESS NOTES
Patient is ready for discharge today to [address] [place] [address] located at [address] [place] [address]. Patient will go to room A16. Nurse to call report to [phone]. CM called ALISSA Lopez [phone], she agrees with discharge today as well back to [facility]. CM to fax Discharge summary to [address] when available. Please call Ms. Chelsea Ulloa [phone] with transportation time when known. Patient will need transportation to return to [facility]. Patient has been accepted with [facility].

## 2021-04-19 NOTE — DISCHARGE SUMMARY
Admit date: 4/11/2021   Admitting Provider: Kenji Perez MD    Discharge date: 4/19/2021  Discharging Provider: Lupe Estrada PA-C      * Admission Diagnoses: Compression fracture of body of thoracic vertebra [facility]) [S22.000A]    * Discharge Diagnoses:    Hospital Problems as of 4/19/2021 Date Reviewed: 4/15/2021          Codes Class Noted - Resolved POA    Acute cystitis ICD-10-CM: N30.00  ICD-9-CM: 595.0  4/19/2021 - Present Unknown        Weakness generalized ICD-10-CM: R53.1  ICD-9-CM: 780.79  4/15/2021 - Present Unknown        Falls ICD-10-CM: W19. Yesi Kimble  ICD-9-CM: E888.9  4/15/2021 - Present Unknown        Compression fracture of body of thoracic vertebra [facility]) ICD-10-CM: S22.000A  ICD-9-CM: 805.2  4/11/2021 - Present Unknown              * Hospital Course:   Patient is a 80 y. o. female who presented to the emergency room on 4/11/2021 from [address] for vague pain in her left side.  Patient has a history of congestive heart failure, dementia, hypertension, anxiety, constipation.  Patient is a poor historian due to the dementia.   Per [address] patient has had recent falls. [place] over the last few days she has had decreased activity and complaining of left-sided pain.  CT of the head showed no acute abnormality but cortical atrophy with chronic small vessel disease.  CT of the lumbar spine showed chronic compression fractures of L2 and L5, compression fractures of L3 and L4 age-indeterminate.  Posterior vertebral body cortical buckling at the L4 level with severe spinal canal narrowing.  CT of the thoracic spine showed multiple compression fractures at T4, T7, T8 which appear chronic. Ortho and IR consulted. MRI of the lumbar spine shows L2-L5 compression fractures chronic. Posterior cortical buckling at the L4 fracture in conjunction with degenerative changes are present with severe spinal canal narrowing.   Neuroforaminal narrowing is significant severe of the right L4-L5 region.  MRI of the thoracic spine reveals a T10 inferior endplate acute compression fracture with bone marrow edema present. Mild edema consistent with subacute fracture of T6, T8 and T12. Moderate to severe spinal cord narrowing of T9 and T10. . Per POA she has had a mulitple falls over the past few months. Kyphoplasty T10 performed with no complications. Pain improved. Ambulating with PT/OT. Restarted home diet of honey thick fluids rather than thin liquids. CXR with no acute process. Atelectasis present. Urinalysis with greater than 100 white cells per high-powered field. Treated with Rocephin for 4 days. Will treat for another 7 days with Cipro. Gram stain revealing no more than 3000 white cells but although gram-negative rods present. Discussed discharge with patient power of  Gabrielle Roach. * Procedures:   * No surgery found *  Cardiology and IR Orders (From admission to next 24h)     Start     Ordered    04/13/21 1100  IR KYPHOPLASTY THORACIC  [[id number]]  ONE TIME      04/13/21 1054                Consults:   Orthopedics and interventional radiology    Significant Diagnostic Studies: As discussed in hospital course    Discharge Exam:  Visit Vitals  /64 (BP 1 Location: Left arm, BP Patient Position: At rest)   Pulse 86   Temp (!) 96.2 °F (35.7 °C)   Resp 17   Ht 5' 3\" (1.6 m)   SpO2 95%   Breastfeeding No   BMI 22.00 kg/m²     PHYSICAL EXAM:  Constitutional: elderly appearing, No acute distress  Skin: Extremities and face reveal no rashes. HEENT: Sclerae anicteric. Extra-occular muscles are intact. No oral ulcers. Cardiovascular: Regular rate and rhythm. Respiratory:  Clear breath sounds bilaterally with no wheezes, rales, or rhonchi. GI: Abdomen nondistended, soft, and nontender. Normal active bowel sounds. Musculoskeletal: No pitting edema of the lower legs. Able to move all ext. Proximal lumbar spine back pain mild at incision site  Neurological:  Patient is alert and oriented.  Cranial nerves II-XII grossly intact  Psychiatric: Mood appears appropriate       * Discharge Condition: stable  * Disposition: Home    Discharge Medications:  Current Discharge Medication List      START taking these medications    Details   ciprofloxacin HCl (Cipro) 500 mg tablet Take 1 Tab by mouth two (2) times a day. Qty: 10 Tab, Refills: 0         CONTINUE these medications which have NOT CHANGED    Details   aspirin delayed-release 81 mg tablet Take 81 mg by mouth daily. busPIRone (BUSPAR) 10 mg tablet Take 10 mg by mouth two (2) times a day. carvediloL (COREG) 3.125 mg tablet Take 3.125 mg by mouth two (2) times a day. clopidogreL (PLAVIX) 75 mg tab Take 75 mg by mouth daily. gabapentin (NEURONTIN) 100 mg capsule Take 100 mg by mouth daily. sennosides/docusate sodium (SENNA PLUS PO) Take 1 Tab by mouth two (2) times a day. spironolactone (ALDACTONE) 25 mg tablet Take 25 mg by mouth daily. cholecalciferol (VITAMIN D3) (5000 Units/125 mcg) tab tablet Take 5,000 Units by mouth daily. atorvastatin (LIPITOR) 10 mg tablet Take 10 mg by mouth daily. mirtazapine (REMERON) 7.5 mg tablet Take 7.5 mg by mouth nightly. acetaminophen (TYLENOL) 325 mg tablet Take 650 mg by mouth every six (6) hours as needed for Pain.      polyethylene glycol (MIRALAX) 17 gram/dose powder Take 17 g by mouth daily as needed for Constipation. Indications: constipation             * Follow-up Care/Patient Instructions:   Activity: Activity as tolerated  Diet: Honey thick fluids  Wound Care: None needed    Follow-up Information     Follow up With Specialties Details Why Contact Info    Eloisa Mancera NP Nurse Practitioner, Nurse Practitioner Call  [address] [facility] [phone]      Dr. Kayla Baker interventional radiology   Call in 1 month  [phone] appointment with interventional radiology in 1 month          Discharge summary greater than 35 minutes spent with the patient performing discharge instructions, medication review and physical exam    Signed:  Andres Chahal PA-C  4/19/2021  2:48 PM

## 2021-04-19 NOTE — PROGRESS NOTES
Patient was given discharge instructions she indicated understanding. Patient was alert with no distress noted. Patient was discharged to [facility] via ambulance. Discharge plan of care/case management plan validated with provider discharge order.

## 2021-04-19 NOTE — PROGRESS NOTES
Problem: Mobility Impaired (Adult and Pediatric)  Goal: *Acute Goals and Plan of Care (Insert Text)  Description: Pt will be I with LE HEP in 7 days. Pt will perform bed mobility with mod I in 7 days. Pt will perform transfers with mod I in 7 days. Pt will amb 10-25 feet with LRAD safely with mod I in 7 days. Outcome: Progressing Towards Goal   PHYSICAL THERAPY TREATMENT  Patient: Cori Flores (85 y.o. female)  Date: 4/19/2021  Diagnosis: Compression fracture of body of thoracic vertebra ([address] [S22.000A] <principal problem not specified>       Precautions:    Chart, physical therapy assessment, plan of care and goals were reviewed. ASSESSMENT  Patient continues with skilled PT services and is progressing towards goals. Pt. Required mod assist X 2 for bed mobility and Tfs this session. Pt. Limited with ambulation distance secondary to pt. Having BM s  as pt. Ambulating in room. Pt. Required min assist X 1 for balance and CGA X 1 for safety with ambulation. Pt. With forward, flexed posture with standing and gt. Dalia Hilda but no LOB noted. Pt. Fatigues easily and requires rest breaks often. Current Level of Function Impacting Discharge (mobility/balance): Assistance X 2 and   safety         PLAN :  Patient continues to benefit from skilled intervention to address the above impairments. Continue treatment per established plan of care. to address goals. Recommendation for discharge: (in order for the patient to meet his/her long term goals)  To be determined: Return to [facility]    This discharge recommendation:  Has been made in collaboration with the attending provider and/or case management    IF patient discharges home will need the following DME: bedside commode, rolling walker, and wheelchair       SUBJECTIVE:   Patient very [facility]. Used paper to write down info to communicate. CO RX with OT for increased functional mobility and safety.     OBJECTIVE DATA SUMMARY:   Critical Behavior:  Neurologic State: Alert  Orientation Level: Oriented to person, Oriented to place  Cognition: Decreased attention/concentration, Decreased command following, Memory loss     Functional Mobility Training:  Bed Mobility:  Rolling: Stand-by assistance  Supine to Sit: Moderate assistance;Assist x2  Sit to Supine: Moderate assistance;Assist x2  Scooting: Moderate assistance  Pt. Needed to be cleaned up upon arrival. Pt. On bedpan. Assisted pt. With cleaning pt and changing gown and linens. Pt. Had several Bms throughout session. Pt. Had BM again during ambulation. Assisted with pt. Then placed pt. Back to bed and PCT in to clean pt. Again. Transfers:  Sit to Stand: Moderate assistance;Assist x2  Stand to Sit: Moderate assistance;Assist x2        Bed to Chair: Moderate assistance;Assist x2                    Balance:  Sitting: Intact; Without support  Standing: Impaired; With support  Standing - Static: Fair;Constant support  Standing - Dynamic : Poor;Constant support  Ambulation/Gait Training:  Distance (ft): 8 Feet (ft)  Assistive Device: Walker, rolling  Ambulation - Level of Assistance: Minimal assistance;Assist x1(plus 1 for safety)                 Base of Support: Narrowed     Speed/Clotilde: Slow;Shuffled  Step Length: Left shortened;Right shortened                         Therapeutic Exercises:     Pain Rating: Pt. Stated pain in back but could not rate. Activity Tolerance:   Fair and requires frequent rest breaks  Please refer to the flowsheet for vital signs taken during this treatment. After treatment patient left in no apparent distress:   Supine in bed, Bed / chair alarm activated, Side rails x 3, and RN notified    COMMUNICATION/COLLABORATION:   The patients plan of care was discussed with: Occupational therapy assistant and Registered nurse.      Dave Argueta   Time Calculation: 39 mins

## 2021-04-19 NOTE — PROGRESS NOTES
Called the number for Dr. Geoff Zaragoza and the number is for the ER at a [facility].   The appointment for Dr. Martinez Senters the facility will have to make for the patient.

## 2021-04-19 NOTE — PROGRESS NOTES
OCCUPATIONAL THERAPY TREATMENT  Patient: Mary Workman (77 y.o. female)  Date: 4/19/2021  Diagnosis: Compression fracture of body of thoracic vertebra ([facility] [address] [S22.000A] <principal problem not specified>       Precautions:    Chart, occupational therapy assessment, plan of care, and goals were reviewed. ASSESSMENT  Patient continues with skilled OT services and is progressing towards goals. Upon VALDES/PTA arrival, pt sleeping and deaf so required written communication. Pt agreed to participate in tx session. Pt laying on bedpan with no knowledge of how long pt had been on. Pt significantly soiled in urine and bowel. Toileting completed in supine with total assistance. Toileting completed in supine with total assistance. Pt completed rolling with SBA, supine>sit with ModA x2, scooting with ModA, and sit>supine with ModA x2. Pt completed sit>stand with ModA x2 and ambulated to chair using RW. Pt had another significant bowel movement on floor while walking. Pt completed chair transfer [address] x2, pt demonstrated/verbalized confusion stating she did not know she had completed bowel movement. Pt returned to EOB and completed sit>supine. Pt stated she was in pain, educated on pursed lip breathing for pain management. Nursing aids entered room to assist with further toileting and cleaning. Pt tolerated session well today and is progressing well. Current Level of Function Impacting Discharge (ADLs): pain levels, ModA x2 sup>sit, ModA x2 sit>stand     Other factors to consider for discharge: time since onset, severity of deficits, PLOF         PLAN :  Patient continues to benefit from skilled intervention to address the above impairments. Continue treatment per established plan of care. to address goals.     Recommend next OT session: self care training, functional mobility training, therapeutic exercise, balance training, therapeutic activities, endurance activities, patient education, and home safety training    Recommendation for discharge: (in order for the patient to meet his/her long term goals)  Therapy up to 5 days/week in SNF setting    This discharge recommendation:  Has been made in collaboration with the attending provider and/or case management    IF patient discharges home will need the following DME: gait belt, hospital bed, and walker: standard       SUBJECTIVE:   Patient stated did I do that? I didn't even feel it.     OBJECTIVE DATA SUMMARY:   Cognitive/Behavioral Status:  Neurologic State: Alert  Orientation Level: Oriented to person;Oriented to place    Functional Mobility and Transfers for ADLs:  Bed Mobility:  Rolling: Stand-by assistance  Supine to Sit: Moderate assistance;Assist x2  Sit to Supine: Moderate assistance;Assist x2  Scooting: Moderate assistance    Transfers:  Sit to Stand: Moderate assistance;Assist x2    Bed to Chair: Moderate assistance;Assist x2    Balance:  Sitting: Intact; Without support  Standing: Impaired; With support  Standing - Static: Fair;Constant support  Standing - Dynamic : Poor;Constant support    ADL Intervention:    Upper Body [address]: Minimum  assistance    Toileting  Toileting Assistance: Total assistance(dependent)  Bladder Hygiene: Total assistance (dependent)  Bowel Hygiene: Total assistance (dependent)    Pain:  0/10    Activity Tolerance:   Poor  Please refer to the flowsheet for vital signs taken during this treatment. After treatment patient left in no apparent distress:   Supine in bed, Side rails x 3, and nurse assistants in room for toileting    COMMUNICATION/COLLABORATION:   The patients plan of care was discussed with: Physical therapy assistant, Registered nurse, Physician, and Certified nursing assistant/patient care technician.      KEISHA Dior  Time Calculation: 39 mins    Problem: Self Care Deficits Care Plan (Adult)  Goal: *Acute Goals and Plan of Care (Insert Text)  Description: Pt will be MI sup<->sit in prep for EOB ADL's  Pt will be min A  LB dressing EOB level  Pt will be MI  grooming EOB level  Pt will be MI  sit<-> prep for toilet transfer  Pt will be MI  BSC transfer with LRAD  Pt will be MI  toileting/cloth mgmt LRAD  Pt will be MI  grooming standing sink  Pt will be MI bathing sitting/standing sink LRAD  Pt will be MI felix UE HEP in prep for self care tasks      Outcome: Progressing Towards Goal

## 2021-04-20 LAB
BACTERIA SPEC CULT: ABNORMAL
COLONY COUNT,CNT: ABNORMAL
COLONY COUNT,CNT: ABNORMAL
SPECIAL REQUESTS,SREQ: ABNORMAL

## 2021-04-27 NOTE — PROGRESS NOTES
Physician Progress Note      PATIENT:               Tonya Almeida  CSN #:                  [id number]  :                       1927  ADMIT DATE:       2021 8:50 AM  DISCH DATE:        2021 1:59 PM  RESPONDING  PROVIDER #:        Angela LORA PA-C          QUERY TEXT:    Dear Mr. Owens Ohs:    Pt admitted with c/o vague pain to her left side. CT scans noted multiple compression fractures. MRI of the thoracic spine reveals a T10 inferior endplate acute compression fracture with bone marrow edema present. Mild edema consistent with subacute fracture of T6, T8 and T12. Moderate to severe spinal cord narrowing of T9 and T10. If possible, please document in progress notes and discharge summary if you are evaluating and/or treating any of the following: The medical record reflects the following:  Risk Factors: 80 F admitted from [address] [place] [address] with vague left side pain, recent falls, dementia  Clinical Indicators:  CT scans noted multiple compression fractures.; MRI of the thoracic spine reveals a T10 inferior endplate acute compression fracture with bone marrow edema present. Mild edema consistent with subacute fracture of T6, T8 and T12. Moderate to severe spinal cord narrowing of T9 and T10.; Kyphoplasty of T10, with core biopsy of the vertebral body - biopsy notes hematopoietic marrow with no histologic abnormality.   Treatment: CT scans, MRI, pain management, therapy, Ortho and IR consults, Kyphoplasty with biopsy    Thank you,  JETHRO BabbN, RN, [place]  Clinical   [phone]  Options provided:  -- Pathological T10 fracture  -- Osteoporotic T10 Fracture  -- Osteoporotic T10 fracture following fall which would not usually break a normal, healthy bone  -- Traumatic T10 fracture  -- Other - I will add my own diagnosis  -- Disagree - Not applicable / Not valid  -- Disagree - Clinically unable to determine / Unknown  -- Refer to Clinical Documentation Reviewer    PROVIDER RESPONSE TEXT:    This patient has an osteoporotic fracture of T10 following fall which would not break a normal, healthy bone.     Query created by: Aleida Burkett on 4/22/2021 6:13 AM      Electronically signed by:  Roxane Vegas PA-C 4/27/2021 7:41 AM

## 2021-07-16 NOTE — WOUND CARE
IP WOUND CONSULT Devin Pereyra MEDICAL RECORD NUMBER:  [id number] AGE: 80 y.o. GENDER: female  : 1927 TODAY'S DATE:  4/15/2021 GENERAL  
 
[] Follow-up [x] New Consult Devin Pereyra is a 80 y.o. female referred by:  
[] Physician 
[x] Nursing 
[] Other: PAST MEDICAL HISTORY Past Medical History:  
Diagnosis Date  Dementia ([address]  Gastrointestinal disorder   
 constipation  Heart failure ([address]  Hypertension  Psychiatric disorder   
 anxiety PAST SURGICAL HISTORY Past Surgical History:  
Procedure Laterality Date  IR KYPHOPLASTY THORACIC  2021 FAMILY HISTORY History reviewed. No pertinent family history. ALLERGIES Allergies Allergen Reactions  Iodine Unknown (comments)  Seafood Unable to [employer] MEDICATIONS No current facility-administered medications on file prior to encounter. Current Outpatient Medications on File Prior to Encounter Medication Sig Dispense Refill  aspirin delayed-release 81 mg tablet Take 81 mg by mouth daily.  busPIRone (BUSPAR) 10 mg tablet Take 10 mg by mouth two (2) times a day.  carvediloL (COREG) 3.125 mg tablet Take 3.125 mg by mouth two (2) times a day.  clopidogreL (PLAVIX) 75 mg tab Take 75 mg by mouth daily.  gabapentin (NEURONTIN) 100 mg capsule Take 100 mg by mouth daily.  sennosides/docusate sodium (SENNA PLUS PO) Take 1 Tab by mouth two (2) times a day.  spironolactone (ALDACTONE) 25 mg tablet Take 25 mg by mouth daily.  cholecalciferol (VITAMIN D3) (5000 Units/125 mcg) tab tablet Take 5,000 Units by mouth daily.  atorvastatin (LIPITOR) 10 mg tablet Take 10 mg by mouth daily.  mirtazapine (REMERON) 7.5 mg tablet Take 7.5 mg by mouth nightly.  acetaminophen (TYLENOL) 325 mg tablet Take 650 mg by mouth every six (6) hours as needed for Pain.     
 polyethylene glycol (MIRALAX) 17 gram/dose powder Take 17 g by mouth daily as Subjective   Patient ID: Carole is a 31 year old female.    Carole Accepted a chaperone.    Chief Complaint   Patient presents with   • Vaginal Problem     yellow, bleed. nexplanon 2020 PAP 2020        HPI this is 31 years old female  4 para 3-0-1-3 last menstrual cycle was on February 15, 2020 which was normal and then patient had vaginal spotting or bleeding on and off every time.  Patient had a history of Nexplanon implant inserted on 2020.    Patient had a Nexplanon implant inserted on 2020.  Patient is very happy with the Nexplanon.  But patient has some brown dark vaginal discharge on and off all year around.  Patient denies having menstrual cycle from long time.  Patient knows it because of the Nexplanon implant.  Patient denies any lower abdominal pelvic pain.  Patient denies any weakness or dizziness.    Patient denies any major medical problem.    Past Medical History:   Diagnosis Date   • Abnormal Pap smear of cervix    • Chlamydia    • HPV in female    • No known problems    • STD (sexually transmitted disease)      Family History   Problem Relation Age of Onset   • Patient is unaware of any medical problems Mother    • Diabetes Father      History reviewed. No pertinent surgical history.  Social History     Tobacco Use   • Smoking status: Never Smoker   • Smokeless tobacco: Never Used   Substance Use Topics   • Alcohol use: Yes   • Drug use: Never     Current Outpatient Medications   Medication Sig Dispense Refill   • amoxicillin (AMOXIL) 500 MG capsule TAKE 1 CAPSULE EVERY 6 HOURS UNTIL GONE     • VIORELE 0.15-0.02/0.01 MG () per tablet TK 1 T PO QD  3     No current facility-administered medications for this visit.       Review of Systems   Constitutional: Negative.    Respiratory: Negative.    Cardiovascular: Negative.    Gastrointestinal: Negative.    Endocrine: Negative.    Genitourinary: Negative.    Skin: Negative.    Allergic/Immunologic: Negative.     Neurological: Negative.    Psychiatric/Behavioral: Negative.        Objective   OBGyn Exam   Vital Signs:   Visit Vitals  /71   Pulse 80   Ht 5' 3\" (1.6 m)   Wt 73.9 kg (162 lb 14.4 oz)   SpO2 100%   BMI 28.86 kg/m²      General Appearance: Well groomed.   Neuropsychiatric: Mood and affect appropriate.    Skin: No rashes.   Left upper extremity: Nexplanon implant is easily palpable on left upper arm.  There is no tenderness at the implant site.  There is no rash or induration at the implant site.  Abdomen: No masses or tenderness. No evidence of hernia. No hepatomegaly or splenomegaly.   Pelvic:  External genitalia: Normal.  Urethral Meatus: Normal.  Urethra: No masses or tenderness.  Bladder: No cystocele.  Vagina: Normal appearance.  Brown-black   discharge.  Like in old blood.  Cervix: Normal appearance.  Brown-black discharge.  No cervical motion tenderness.  Uterus: Anteverted, normal size, mobile not tender.  Adnexa: No masses or tenderness.   Anus/perineum: Normal.      Assessment This is 31 years old female  4 para 3-0-1-3 last menstrual cycle was on February 15, 2020 since then patient had a irregular vaginal bleeding and spotting s/p Nexplanon insertion done on 2020 here today for vaginal discharge.    Patient had a history of Nexplanon insertion done on 2020 for family-planning purposes.  Problem List Items Addressed This Visit     None          Plan  this is 31 years old female  4 para 3-0-1-3 last menstrual cycle was on February 15, 2020 which was normal and then patient had vaginal spotting or bleeding on and off every time.  Patient had a history of Nexplanon implant inserted on 2020.    Patient had a Nexplanon implant inserted on 2020.  Patient is very happy with the Nexplanon.  But patient has some brown dark vaginal discharge on and off all year around.  Patient denies having menstrual cycle from long time.  Patient knows it because of the  needed for Constipation. Indications: constipation [unfilled] Visit Vitals /60 Pulse 69 Temp 98.1 °F (36.7 °C) Resp 20 Ht 5' 3\" (1.6 m) SpO2 93% Breastfeeding No  
BMI 22.00 kg/m² ASSESSMENT Wound Identification & Type: Wound of unknown etiology to left buttock and posterior thigh Dressing change: Zinc paste Verbal consent for picture: Cognitive Impairment Contributing Factors: chronic pressure, decreased mobility, shear force, incontinence of urine and Dementia Wound Buttocks Left; Outer (Active) Number of days: 0 Wound Heel Left Heel blanches minimally. Float to prevent further deterioration (Active) Number of days: 0 [REMOVED] Wound Buttocks looks like  bedpan outline light red area rash like had surgery today 04/14/21 (Removed) Wound Image   04/15/21 1503 Wound Etiology Other (Comment) 04/15/21 1503 Dressing Status Clean;Dry 04/15/21 1503 Cleansed Other (Comment) 04/15/21 1503 Dressing/Treatment Zinc paste 04/15/21 1503 Wound Assessment Purple/maroon;Erythema 04/15/21 1503 Drainage Amount None 04/15/21 1503 Wound Odor None 04/15/21 1503 Tori-Wound/Incision Assessment Intact;Fragile 04/15/21 1503 Edges Attached edges 04/15/21 1503 Number of days: 1 PLAN Skin Care & Pressure Relief Recommendations Minimize layers of linen Turn/reposition approximately every 2 hours Pillow wedges Manage incontinence Promote continence; Skin Protective lotion/cream to buttocks and sacrum daily and as needed with incontinence care Offload heels pillows Jairon 12 Blood Glucose: 79 on 4/13/21 Albumin: 3.2 on 2/10/21 (need recent value) WBCs: 6.8 on 4/13/21 Support Surface: [facility] Financial Physician/Provider notified:  
Recommendations: Maintain PureWick for management of  incontinence. Apply zinc paste to buttocks and sacrum TID and prn for soiling due to incontinence.   Float heels Nexplanon implant.  Patient denies any lower abdominal pelvic pain.  Patient denies any weakness or dizziness.    Patient denies any major medical problem.    Vital signs stable.    Left upper and has a Nexplanon implant easily palpable.  It is in place and there is no tenderness or induration or infection at the insertion site.    Abdomen soft nontender.    Pelvic examination confirmed there is no lesion on the vulva or vagina.  The vagina has some brown-black discharge which is like an old blood.  There is no cervical motion tenderness and cervix also had a same discharge.  Uterus anteverted slightly enlarged mobile and no adnexal mass palpable.    So gynecological exam is normal informed to the patient.  The vaginal discharge on and off is related to the hormonal Nexplanon implant informed to the patient.  If discharge bothers too much or she bleeds we can always get some birth control pills or add on some other hormone informed to the patient but patient verbalized everything is okay and there is no need of any medication at present.  Continue follow-up in 6-month.      Precautions:  Reviewed COVID-19 precautions:  Instructed to practice frequent hand washing and social distancing.    with pillows while in bed. Ensure pillow is between BLEs when positioned on side. Patient remained on right side after assessment this morning resting comfortably. Heels were floated with pillows. Maintain HOB at 30 degrees or less if not contraindicated. Ensure turning at 30 degree angle or more q2h. Bilateral heels red but blanchable. Will continue to follow. Teaching completed with:  
[] Patient          
[] Family member      
[] Caregiver         
[] Nursing 
[] Other Patient/Caregiver Teaching: 
Level of patient/caregiver understanding able to:  
[] Indicates understanding       [] Needs reinforcement 
[] Unsuccessful      [] Verbal Understanding 
[] Demonstrated understanding       [] No evidence of learning 
[] Refused teaching         [] N/A Electronically signed by Jana Ornelas RN on 4/15/2021 at 4:01 PM

## 2022-03-18 PROBLEM — N30.00 ACUTE CYSTITIS: Status: ACTIVE | Noted: 2021-04-19

## 2022-03-19 PROBLEM — R53.1 WEAKNESS GENERALIZED: Status: ACTIVE | Noted: 2021-04-15

## 2022-03-19 PROBLEM — S22.000A COMPRESSION FRACTURE OF BODY OF THORACIC VERTEBRA (HCC): Status: ACTIVE | Noted: 2021-04-11
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
## 2023-02-03 ENCOUNTER — APPOINTMENT (OUTPATIENT)
Dept: GENERAL RADIOLOGY | Age: 88
End: 2023-02-03
Attending: STUDENT IN AN ORGANIZED HEALTH CARE EDUCATION/TRAINING PROGRAM
Payer: MEDICARE

## 2023-02-03 ENCOUNTER — HOSPITAL ENCOUNTER (EMERGENCY)
Age: 88
Discharge: HOME | End: 2023-02-03
Attending: STUDENT IN AN ORGANIZED HEALTH CARE EDUCATION/TRAINING PROGRAM | Admitting: STUDENT IN AN ORGANIZED HEALTH CARE EDUCATION/TRAINING PROGRAM
Payer: MEDICARE

## 2023-02-03 VITALS
HEIGHT: 66 IN | DIASTOLIC BLOOD PRESSURE: 45 MMHG | OXYGEN SATURATION: 100 % | WEIGHT: 124 LBS | SYSTOLIC BLOOD PRESSURE: 94 MMHG | HEART RATE: 65 BPM | BODY MASS INDEX: 19.93 KG/M2 | RESPIRATION RATE: 19 BRPM | TEMPERATURE: 97.8 F

## 2023-02-03 DIAGNOSIS — S90.01XA CONTUSION OF RIGHT ANKLE, INITIAL ENCOUNTER: Primary | ICD-10-CM

## 2023-02-03 DIAGNOSIS — S82.831A OTHER CLOSED FRACTURE OF DISTAL END OF RIGHT FIBULA, INITIAL ENCOUNTER: ICD-10-CM

## 2023-02-03 PROCEDURE — 73610 X-RAY EXAM OF ANKLE: CPT

## 2023-02-03 PROCEDURE — 74011250636 HC RX REV CODE- 250/636: Performed by: STUDENT IN AN ORGANIZED HEALTH CARE EDUCATION/TRAINING PROGRAM

## 2023-02-03 PROCEDURE — 96372 THER/PROPH/DIAG INJ SC/IM: CPT

## 2023-02-03 PROCEDURE — 99284 EMERGENCY DEPT VISIT MOD MDM: CPT

## 2023-02-03 PROCEDURE — 73590 X-RAY EXAM OF LOWER LEG: CPT

## 2023-02-03 PROCEDURE — 74011250637 HC RX REV CODE- 250/637: Performed by: STUDENT IN AN ORGANIZED HEALTH CARE EDUCATION/TRAINING PROGRAM

## 2023-02-03 PROCEDURE — 73080 X-RAY EXAM OF ELBOW: CPT

## 2023-02-03 PROCEDURE — 73502 X-RAY EXAM HIP UNI 2-3 VIEWS: CPT

## 2023-02-03 PROCEDURE — 73562 X-RAY EXAM OF KNEE 3: CPT

## 2023-02-03 RX ORDER — KETOROLAC TROMETHAMINE 30 MG/ML
15 INJECTION, SOLUTION INTRAMUSCULAR; INTRAVENOUS
Status: DISCONTINUED | OUTPATIENT
Start: 2023-02-03 | End: 2023-02-03

## 2023-02-03 RX ORDER — ACETAMINOPHEN 325 MG/1
975 TABLET ORAL
Status: COMPLETED | OUTPATIENT
Start: 2023-02-03 | End: 2023-02-03

## 2023-02-03 RX ORDER — KETOROLAC TROMETHAMINE 30 MG/ML
30 INJECTION, SOLUTION INTRAMUSCULAR; INTRAVENOUS
Status: COMPLETED | OUTPATIENT
Start: 2023-02-03 | End: 2023-02-03

## 2023-02-03 RX ADMIN — ACETAMINOPHEN 975 MG: 325 TABLET ORAL at 10:01

## 2023-02-03 RX ADMIN — KETOROLAC TROMETHAMINE 30 MG: 30 INJECTION, SOLUTION INTRAMUSCULAR; INTRAVENOUS at 10:02

## 2023-02-03 NOTE — ED TRIAGE NOTES
Patient from [facility] living [facility], hx of dementia, alert but some confusion. Per EMS patient normally is wheel chair bound, has been complaining of right ankle pain, right hip pain, today is day three. Unknown how injury occurred. Right ankle swelling, bruising present. Patient stated that her hip is hurting as well. Sent over for possible fx of right ankle. No other complaints at this time.

## 2023-02-03 NOTE — ED PROVIDER NOTES
[facility] EMERGENCY DEPT  EMERGENCY DEPARTMENT HISTORY AND PHYSICAL EXAM      Date: 2/3/2023  Patient Name: Amando Huff  MRN: [id number]  [place] 8/1/1927  Date of evaluation: 2/3/2023  Provider: Carlos Carroll MD   Note Started: 11:44 AM 2/3/23    HISTORY OF PRESENT ILLNESS     Chief Complaint   Patient presents with    Ankle Injury    Hip Pain       History Provided By: Patient and EMS    HPI: Amando Huff, 80 y.o. female resident in [facility] assisted living facility with a history of dementia presents for evaluation of right ankle bruising. Patient endorsing right hip and ankle pain for the last 3 days. Staff does not know how she injured herself. Patient is wheelchair-bound, not typically ambulatory. Patient also endorsing pain in the right knee. PAST MEDICAL HISTORY   Past Medical History:  Past Medical History:   Diagnosis Date    Dementia ([address]     Gastrointestinal disorder     constipation    Heart failure ([address]     Hypertension     Psychiatric disorder     anxiety       Past Surgical History:  Past Surgical History:   Procedure Laterality Date    IR KYPHOPLASTY THORACIC  4/14/2021       Family History:  History reviewed. No pertinent family history. Social History:  Social History     Tobacco Use    Smoking status: Unknown   Substance Use Topics    Alcohol use: Not Currently       Allergies: Allergies   Allergen Reactions    Iodine Unknown (comments)    Seafood Unable to Obtain       PCP: James Martin MD    Current Meds:   Previous Medications    ACETAMINOPHEN (TYLENOL) 325 MG TABLET    Take 650 mg by mouth every six (6) hours as needed for Pain. ASPIRIN DELAYED-RELEASE 81 MG TABLET    Take 81 mg by mouth daily. ATORVASTATIN (LIPITOR) 10 MG TABLET    Take 10 mg by mouth daily. BUSPIRONE (BUSPAR) 10 MG TABLET    Take 10 mg by mouth two (2) times a day. CARVEDILOL (COREG) 3.125 MG TABLET    Take 3.125 mg by mouth two (2) times a day.     CHOLECALCIFEROL (VITAMIN D3) (5000 UNITS/125 MCG) TAB TABLET    Take 5,000 Units by mouth daily. CIPROFLOXACIN HCL (CIPRO) 500 MG TABLET    Take 1 Tab by mouth two (2) times a day. CLOPIDOGREL (PLAVIX) 75 MG TAB    Take 75 mg by mouth daily. GABAPENTIN (NEURONTIN) 100 MG CAPSULE    Take 100 mg by mouth daily. MIRTAZAPINE (REMERON) 7.5 MG TABLET    Take 7.5 mg by mouth nightly. POLYETHYLENE GLYCOL (MIRALAX) 17 GRAM/DOSE POWDER    Take 17 g by mouth daily as needed for Constipation. Indications: constipation    SENNOSIDES/DOCUSATE SODIUM (SENNA PLUS PO)    Take 1 Tab by mouth two (2) times a day. SPIRONOLACTONE (ALDACTONE) 25 MG TABLET    Take 25 mg by mouth daily. REVIEW OF SYSTEMS   Review of Systems  Unable to perform due to dementia  Positives and Pertinent negatives as per HPI. PHYSICAL EXAM     ED Triage Vitals   ED Encounter Vitals Group      BP 02/03/23 0952 (!) 123/101      Pulse (Heart Rate) 02/03/23 0952 70      Resp Rate 02/03/23 0952 20      Temp 02/03/23 0952 97.4 °F (36.3 °C)      Temp src --       O2 Sat (%) 02/03/23 0952 100 %      Weight 02/03/23 0940 124 lb      Height 02/03/23 0940 5' 6\"     Physical Exam  Constitutional:       Appearance: Normal appearance. HENT:      Head: Normocephalic and atraumatic. Nose: Nose normal.      Mouth/Throat:      Mouth: Mucous membranes are moist.   Eyes:      Conjunctiva/sclera: Conjunctivae normal.      Pupils: Pupils are equal, round, and reactive to light. Cardiovascular:      Rate and Rhythm: Normal rate and regular rhythm. Heart sounds: Normal heart sounds. Pulmonary:      Effort: Pulmonary effort is normal.      Breath sounds: Normal breath sounds. Abdominal:      General: There is no distension. Palpations: Abdomen is soft. Tenderness: There is no abdominal tenderness. Musculoskeletal:         General: No tenderness or deformity. Normal range of motion. Cervical back: Normal range of motion and neck supple.       Comments: Bruising and swelling to the right ankle   Skin:     General: Skin is warm and dry. Neurological:      General: No focal deficit present. Mental Status: She is alert and oriented to person, place, and time. Psychiatric:         Mood and Affect: Mood normal.         Behavior: Behavior normal.          SCREENINGS               No data recorded        LAB, EKG AND DIAGNOSTIC RESULTS   Labs:  No results found for this or any previous visit (from the past 12 hour(s)). Radiologic Studies:  Interpretation per the Radiologist below, if available at the time of this note:  XR ELBOW RT MIN 3 V    Result Date: 2/3/2023  EXAM: XR ELBOW RT MIN 3 V INDICATION: eval for fx. COMPARISON: None. FINDINGS: Three views of the right elbow demonstrate no fracture, dislocation, effusion or other acute abnormality. The bones are moderately to severely osteopenic. No acute abnormality. XR HIP RT W OR WO PELV 2-3 VWS    Result Date: 2/3/2023  EXAM: XR HIP RT W OR WO PELV 2-3 VWS INDICATION: eval for fracture. COMPARISON: None. FINDINGS: AP view of the pelvis and a frogleg lateral view of the right hip demonstrate severe diffuse osteopenia. No acute fracture or dislocation is shown. Moderate degenerative spondylosis in the lumbosacral junction and mild bilateral SI joint and hip joint osteoarthrosis are shown. No acute fracture demonstrated. XR TIB/FIB RT    Result Date: 2/3/2023  EXAM: XR TIB/FIB RT INDICATION: Right lower leg pain. COMPARISON: 2/3/2023. FINDINGS: 2 views right tibia/fibula. There is an acute nondisplaced distal fibular fracture with overlying soft tissue swelling. There is no other acute fracture in the right lower leg. The joint spaces are maintained. Acute nondisplaced distal fibular fracture. XR ANKLE RT MIN 3 V    Result Date: 2/3/2023  EXAM: XR ANKLE RT MIN 3 V INDICATION: eval for fracture. COMPARISON: Right leg radiographs 2/22/2011.  FINDINGS: Three views of the right ankle demonstrate no fracture or disruption of the ankle mortise. The bones are severely osteopenic. There is soft tissue swelling anteriorly and laterally greater than medially. No substantial arthrosis is shown. No acute fracture or dislocation demonstrated. XR KNEE RT 3 V    Result Date: 2/3/2023  EXAM: XR KNEE RT 3 V INDICATION: eval for fracture. COMPARISON: Right leg radiographs 2/22/2011. FINDINGS: 3 views of the right knee show moderate to severe diffuse osteopenia. No acute fracture or dislocation is shown. There is no effusion. There is mild to moderate medial compartment osteoarthrosis and mild patellofemoral compartment osteoarthrosis. Extensive vascular calcifications are shown. No acute fracture or dislocation demonstrated. PROCEDURES   Unless otherwise noted below, none  Performed by: Jalyn Adan MD   Procedures        EMERGENCY DEPARTMENT COURSE and DIFFERENTIAL DIAGNOSIS/MDM   Vitals:    Vitals:    02/03/23 0952 02/03/23 0953 02/03/23 1006 02/03/23 1250   BP: (!) 123/101  (!) 129/57 (!) 94/45   Pulse: 70  71 65   Resp: 20  20 19   Temp: 97.4 °F (36.3 °C)  97.6 °F (36.4 °C) 97.8 °F (36.6 °C)   SpO2: 100% 100% 100% 100%   Weight:       Height:            Patient was given the following medications:  Medications   acetaminophen (TYLENOL) tablet 975 mg (975 mg Oral Given 2/3/23 1001)   ketorolac (TORADOL) injection 30 mg (30 mg IntraMUSCular Given 2/3/23 1002)       CC/HPI Summary, DDx, ED Course, and Reassessment:   42-year-old female presents for evaluation of right hip and ankle pain. She additionally endorses right knee pain. Physical exam notable for swelling and bruising to the right ankle. Suspect contusion versus sprain versus fracture. Will evaluate with x-ray of right ankle, knee and hip with pain management with Tylenol and Toradol. ED Course as of 02/03/23 1432   Fri Feb 03, 2023   1135 No acute pathology identified on x-rays of right ankle, knee, hip and elbow.  [BQ]   1320 Upon working towards discharge, patient found to have paperwork that shows tib-fib x-ray with evidence of distal tibial fracture. We will repeat tib-fib here. In reviewing the ankle x-ray, I do identify a distal fibula fracture. Patient is nonambulatory so we will place in splint and recommend follow-up with orthopedic surgery. [BQ]      ED Course User Index  [BQ] Anju Baldwin MD           ED FINAL IMPRESSION     1. Contusion of right ankle, initial encounter    2. Other closed fracture of distal end of right fibula, initial encounter          DISPOSITION/PLAN   Discharged    Discharge Note: The patient is stable for discharge home. The signs, symptoms, diagnosis, and discharge instructions have been discussed, understanding conveyed, and agreed upon. The patient is to follow up as recommended or return to ER should their symptoms worsen. PATIENT REFERRED TO:  Follow-up Information       Follow up With Specialties Details Why Contact Info    Becki Montes De Oca MD Pulmonary Disease In 3 days      [facility]  Schedule an appointment as soon as possible for a visit in 2 weeks  [address]:  Current Discharge Medication List            DISCONTINUED MEDICATIONS:  Current Discharge Medication List          I am the Primary Clinician of Record. Tina Garrett MD (electronically signed)    (Please note that parts of this dictation were completed with voice recognition software. Quite often unanticipated grammatical, syntax, homophones, and other interpretive errors are inadvertently transcribed by the computer software. Please disregards these errors.  Please excuse any errors that have escaped final proofreading.)

## 2023-05-19 RX ORDER — BUSPIRONE HYDROCHLORIDE 10 MG/1
10 TABLET ORAL 2 TIMES DAILY
COMMUNITY

## 2023-05-19 RX ORDER — POLYETHYLENE GLYCOL 3350 17 G/17G
17 POWDER, FOR SOLUTION ORAL DAILY PRN
COMMUNITY

## 2023-05-19 RX ORDER — CARVEDILOL 3.12 MG/1
3.12 TABLET ORAL 2 TIMES DAILY
COMMUNITY

## 2023-05-19 RX ORDER — CLOPIDOGREL BISULFATE 75 MG/1
75 TABLET ORAL DAILY
COMMUNITY

## 2023-05-19 RX ORDER — SPIRONOLACTONE 25 MG/1
25 TABLET ORAL DAILY
COMMUNITY

## 2023-05-19 RX ORDER — ASPIRIN 81 MG/1
81 TABLET ORAL DAILY
COMMUNITY

## 2023-05-19 RX ORDER — ATORVASTATIN CALCIUM 10 MG/1
10 TABLET, FILM COATED ORAL DAILY
COMMUNITY

## 2023-05-19 RX ORDER — CIPROFLOXACIN 500 MG/1
500 TABLET, FILM COATED ORAL 2 TIMES DAILY
COMMUNITY
Start: 2021-04-19

## 2023-05-19 RX ORDER — ACETAMINOPHEN 325 MG/1
650 TABLET ORAL EVERY 6 HOURS PRN
COMMUNITY

## 2023-05-19 RX ORDER — GABAPENTIN 100 MG/1
100 CAPSULE ORAL DAILY
COMMUNITY

## 2023-05-19 RX ORDER — MIRTAZAPINE 7.5 MG/1
7.5 TABLET, FILM COATED ORAL NIGHTLY
COMMUNITY

## 2023-06-03 ENCOUNTER — HOSPITAL ENCOUNTER (EMERGENCY)
Facility: HOSPITAL | Age: 88
Discharge: HOME | End: 2023-06-04
Attending: STUDENT IN AN ORGANIZED HEALTH CARE EDUCATION/TRAINING PROGRAM
Payer: OTHER GOVERNMENT

## 2023-06-03 DIAGNOSIS — S09.90XA CLOSED HEAD INJURY, INITIAL ENCOUNTER: ICD-10-CM
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
## 2023-06-03 DIAGNOSIS — T14.8XXA CONTUSION OF SOFT TISSUE: ICD-10-CM

## 2023-06-03 PROCEDURE — 84484 ASSAY OF TROPONIN QUANT: CPT

## 2023-06-03 PROCEDURE — 36415 COLL VENOUS BLD VENIPUNCTURE: CPT

## 2023-06-03 PROCEDURE — 85025 COMPLETE CBC W/AUTO DIFF WBC: CPT

## 2023-06-03 PROCEDURE — 93005 ELECTROCARDIOGRAM TRACING: CPT | Performed by: STUDENT IN AN ORGANIZED HEALTH CARE EDUCATION/TRAINING PROGRAM

## 2023-06-03 PROCEDURE — 99284 EMERGENCY DEPT VISIT MOD MDM: CPT

## 2023-06-03 PROCEDURE — 80053 COMPREHEN METABOLIC PANEL: CPT

## 2023-06-04 ENCOUNTER — APPOINTMENT (OUTPATIENT)
Facility: HOSPITAL | Age: 88
End: 2023-06-04
Payer: OTHER GOVERNMENT

## 2023-06-04 VITALS
RESPIRATION RATE: 20 BRPM | TEMPERATURE: 98.6 F | WEIGHT: 130 LBS | DIASTOLIC BLOOD PRESSURE: 65 MMHG | HEART RATE: 68 BPM | HEIGHT: 62 IN | OXYGEN SATURATION: 98 % | SYSTOLIC BLOOD PRESSURE: 120 MMHG | BODY MASS INDEX: 23.92 KG/M2

## 2023-06-04 LAB
ALBUMIN SERPL-MCNC: 3.6 G/DL (ref 3.5–5)
ALBUMIN/GLOB SERPL: 0.8 (ref 1.1–2.2)
ALP SERPL-CCNC: 128 U/L (ref 45–117)
ALT SERPL-CCNC: 18 U/L (ref 12–78)
ANION GAP SERPL CALC-SCNC: 6 MMOL/L (ref 5–15)
AST SERPL W P-5'-P-CCNC: 22 U/L (ref 15–37)
BASOPHILS # BLD: 0.1 K/UL (ref 0–0.1)
BASOPHILS NFR BLD: 1 % (ref 0–1)
BILIRUB SERPL-MCNC: 0.6 MG/DL (ref 0.2–1)
BUN SERPL-MCNC: 12 MG/DL (ref 6–20)
BUN/CREAT SERPL: 12 (ref 12–20)
CA-I BLD-MCNC: 9.2 MG/DL (ref 8.5–10.1)
CHLORIDE SERPL-SCNC: 98 MMOL/L (ref 97–108)
CO2 SERPL-SCNC: 28 MMOL/L (ref 21–32)
CREAT SERPL-MCNC: 1 MG/DL (ref 0.55–1.02)
DIFFERENTIAL METHOD BLD: ABNORMAL
EOSINOPHIL # BLD: 0.3 K/UL (ref 0–0.4)
EOSINOPHIL NFR BLD: 3 % (ref 0–7)
ERYTHROCYTE [DISTWIDTH] IN BLOOD BY AUTOMATED COUNT: 14.6 % (ref 11.5–14.5)
GLOBULIN SER CALC-MCNC: 4.4 G/DL (ref 2–4)
GLUCOSE SERPL-MCNC: 93 MG/DL (ref 65–100)
HCT VFR BLD AUTO: 38.6 % (ref 35–47)
HGB BLD-MCNC: 12.5 G/DL (ref 11.5–16)
IMM GRANULOCYTES # BLD AUTO: 0.1 K/UL (ref 0–0.04)
IMM GRANULOCYTES NFR BLD AUTO: 1 % (ref 0–0.5)
LYMPHOCYTES # BLD: 2.2 K/UL (ref 0.8–3.5)
LYMPHOCYTES NFR BLD: 22 % (ref 12–49)
MCH RBC QN AUTO: 31.4 PG (ref 26–34)
MCHC RBC AUTO-ENTMCNC: 32.4 G/DL (ref 30–36.5)
MCV RBC AUTO: 97 FL (ref 80–99)
MONOCYTES # BLD: 0.9 K/UL (ref 0–1)
MONOCYTES NFR BLD: 9 % (ref 5–13)
NEUTS SEG # BLD: 6.5 K/UL (ref 1.8–8)
NEUTS SEG NFR BLD: 64 % (ref 32–75)
NRBC # BLD: 0 K/UL (ref 0–0.01)
NRBC BLD-RTO: 0 PER 100 WBC
PLATELET # BLD AUTO: 248 K/UL (ref 150–400)
PMV BLD AUTO: 9.1 FL (ref 8.9–12.9)
POTASSIUM SERPL-SCNC: 4.5 MMOL/L (ref 3.5–5.1)
PROT SERPL-MCNC: 8 G/DL (ref 6.4–8.2)
RBC # BLD AUTO: 3.98 M/UL (ref 3.8–5.2)
SODIUM SERPL-SCNC: 132 MMOL/L (ref 136–145)
TROPONIN I SERPL HS-MCNC: 7 NG/L (ref 0–51)
TROPONIN I SERPL HS-MCNC: 7 NG/L (ref 0–51)
WBC # BLD AUTO: 9.9 K/UL (ref 3.6–11)

## 2023-06-04 PROCEDURE — 72125 CT NECK SPINE W/O DYE: CPT

## 2023-06-04 PROCEDURE — 70486 CT MAXILLOFACIAL W/O DYE: CPT

## 2023-06-04 PROCEDURE — 70450 CT HEAD/BRAIN W/O DYE: CPT

## 2023-06-04 ASSESSMENT — LIFESTYLE VARIABLES
HOW OFTEN DO YOU HAVE A DRINK CONTAINING ALCOHOL: NEVER
HOW MANY STANDARD DRINKS CONTAINING ALCOHOL DO YOU HAVE ON A TYPICAL DAY: PATIENT DOES NOT DRINK

## 2023-06-05 LAB
EKG ATRIAL RATE: 67 BPM
EKG DIAGNOSIS: NORMAL
EKG P AXIS: 34 DEGREES
EKG P-R INTERVAL: 178 MS
EKG Q-T INTERVAL: 382 MS
EKG QRS DURATION: 94 MS
EKG QTC CALCULATION (BAZETT): 403 MS
EKG R AXIS: -60 DEGREES
EKG T AXIS: 36 DEGREES
EKG VENTRICULAR RATE: 67 BPM

## 2024-04-12 ENCOUNTER — HOSPITAL ENCOUNTER (EMERGENCY)
Facility: HOSPITAL | Age: 89
Discharge: HOME | End: 2024-04-13
Attending: STUDENT IN AN ORGANIZED HEALTH CARE EDUCATION/TRAINING PROGRAM
Payer: MEDICARE

## 2024-04-12 ENCOUNTER — APPOINTMENT (OUTPATIENT)
Facility: HOSPITAL | Age: 89
End: 2024-04-12
Payer: MEDICARE

## 2024-04-12 DIAGNOSIS — S00.83XA CONTUSION OF FACE, INITIAL ENCOUNTER: Primary | ICD-10-CM

## 2024-04-12 DIAGNOSIS — S09.90XA INJURY OF HEAD, INITIAL ENCOUNTER: ICD-10-CM

## 2024-04-12 PROCEDURE — 99284 EMERGENCY DEPT VISIT MOD MDM: CPT

## 2024-04-12 PROCEDURE — 70450 CT HEAD/BRAIN W/O DYE: CPT

## 2024-04-12 PROCEDURE — 70486 CT MAXILLOFACIAL W/O DYE: CPT

## 2024-04-12 PROCEDURE — 72125 CT NECK SPINE W/O DYE: CPT

## 2024-04-12 RX ORDER — SPIRONOLACTONE 50 MG/1
50 TABLET, FILM COATED ORAL DAILY
COMMUNITY
Start: 2024-04-03

## 2024-04-12 RX ORDER — ACETAMINOPHEN 325 MG/1
650 TABLET ORAL
Status: COMPLETED | OUTPATIENT
Start: 2024-04-12 | End: 2024-04-13

## 2024-04-12 ASSESSMENT — LIFESTYLE VARIABLES
HOW OFTEN DO YOU HAVE A DRINK CONTAINING ALCOHOL: NEVER
HOW MANY STANDARD DRINKS CONTAINING ALCOHOL DO YOU HAVE ON A TYPICAL DAY: PATIENT DOES NOT DRINK
HOW OFTEN DO YOU HAVE A DRINK CONTAINING ALCOHOL: NEVER
HOW MANY STANDARD DRINKS CONTAINING ALCOHOL DO YOU HAVE ON A TYPICAL DAY: PATIENT DOES NOT DRINK

## 2024-04-12 ASSESSMENT — PAIN SCALES - GENERAL: PAINLEVEL_OUTOF10: 6

## 2024-04-13 VITALS
DIASTOLIC BLOOD PRESSURE: 51 MMHG | HEART RATE: 76 BPM | BODY MASS INDEX: 27.6 KG/M2 | TEMPERATURE: 97.9 F | SYSTOLIC BLOOD PRESSURE: 125 MMHG | WEIGHT: 150 LBS | OXYGEN SATURATION: 96 % | RESPIRATION RATE: 17 BRPM | HEIGHT: 62 IN

## 2024-04-13 PROCEDURE — 6370000000 HC RX 637 (ALT 250 FOR IP): Performed by: STUDENT IN AN ORGANIZED HEALTH CARE EDUCATION/TRAINING PROGRAM

## 2024-04-13 RX ADMIN — ACETAMINOPHEN 650 MG: 325 TABLET ORAL at 00:23

## 2024-04-13 NOTE — ED TRIAGE NOTES
Pt BIB EMS for fall from wheelchair to ground. Unknown LOC, patient does take Plavix. Pt was alert to baseline, has dementia, on EMS arrival. Patient has bruises to face, as well as abrasions.

## 2024-04-13 NOTE — DISCHARGE INSTRUCTIONS
Thank you!  Thank you for allowing me to care for you in the emergency department. It is my goal to provide you with excellent care.  Please fill out the survey that will come to you by mail or email since we listen to your feedback!     Below you will find a list of your tests from today's visit.  Should you have any questions, please do not hesitate to call the emergency department.    Labs  No results found for this or any previous visit (from the past 12 hour(s)).    Radiologic Studies  CT Head W/O Contrast   Final Result   1. Contusions of the right forehead and nose.   2. No fracture.   3. No acute intracranial process.      CT CSpine W/O Contrast   Final Result   1. Contusions of the right forehead and nose.   2. No fracture.   3. No acute intracranial process.      CT MAXILLOFACIAL WO CONTRAST   Final Result   1. Contusions of the right forehead and nose.   2. No fracture.   3. No acute intracranial process.        ------------------------------------------------------------------------------------------------------------  The exam and treatment you received in the Emergency Department were for an urgent problem and are not intended as complete care. It is important that you follow-up with a doctor, nurse practitioner, or physician assistant to:  (1) confirm your diagnosis,  (2) re-evaluation of changes in your illness and treatment, and (3) for ongoing care. Please take your discharge instructions with you when you go to your follow-up appointment.     If you have any problem arranging a follow-up appointment, contact the Emergency Department.  If your symptoms become worse or you do not improve as expected and you are unable to reach your health care provider, please return to the Emergency Department. We are available 24 hours a day.     If a prescription has been provided, please have it filled as soon as possible to prevent a delay in treatment. If you have any questions or reservations about

## 2024-04-14 NOTE — ED PROVIDER NOTES
[facility] EMERGENCY DEPT  EMERGENCY DEPARTMENT HISTORY AND PHYSICAL EXAM      Date: 4/12/2024  Patient Name: Hair Borden  MRN: [id number]  Birthdate 8/1/1927  Date of evaluation: 4/12/2024  Provider: Oswaldo Terrell MD   Note Started: 4:28 AM EDT 4/14/24    HISTORY OF PRESENT ILLNESS     Chief Complaint   Patient presents with    Fall       History Provided By: EMS    HPI: Hair Borden is a 89 y.o. female with past medical history as reviewed below presents for evaluation of head injury.  Patient reportedly fell forward out of her wheelchair.  No known loss of consciousness.  Patient is reportedly at her neurologic baseline.  Patient unable to contribute to history.    PAST MEDICAL HISTORY   Past Medical History:  Past Medical History:   Diagnosis Date    Dementia (HCC)     Gastrointestinal disorder     constipation    Heart failure (HCC)     Hypertension     Psychiatric disorder     anxiety       Past Surgical History:  Past Surgical History:   Procedure Laterality Date    IR KYPHOPLASTY THORACIC FIRST LEVEL  4/14/2021    IR KYPHOPLASTY THORACIC FIRST LEVEL 4/14/2021 [facility] RAD ANGIO IR    IR KYPHOPLASTY THORACIC FIRST LEVEL  4/14/2021       Family History:  History reviewed. No pertinent family history.    Social History:  Social History     Tobacco Use    Smoking status: Unknown   Substance Use Topics    Alcohol use: Not Currently       Allergies:  Allergies   Allergen Reactions    Iodine      Other reaction(s): Unknown (comments)    Shellfish-Derived Products      Other reaction(s): Unable to Obtain       PCP: Nader Sherman MD    Current Meds:   No current facility-administered medications for this encounter.     Current Outpatient Medications   Medication Sig Dispense Refill    spironolactone (ALDACTONE) 50 MG tablet Take 1 tablet by mouth daily      acetaminophen (TYLENOL) 325 MG tablet Take 2 tablets by mouth every 6 hours as needed      aspirin 81 MG EC tablet Take 1 tablet by mouth daily

## 2024-06-04 ENCOUNTER — HOSPITAL ENCOUNTER (EMERGENCY)
Facility: HOSPITAL | Age: 89
Discharge: NURSING, REHAB OR LONG-TERM CARE | End: 2024-06-05
Attending: STUDENT IN AN ORGANIZED HEALTH CARE EDUCATION/TRAINING PROGRAM
Payer: MEDICARE

## 2024-06-04 VITALS
RESPIRATION RATE: 17 BRPM | OXYGEN SATURATION: 98 % | SYSTOLIC BLOOD PRESSURE: 114 MMHG | HEART RATE: 78 BPM | DIASTOLIC BLOOD PRESSURE: 67 MMHG

## 2024-06-04 DIAGNOSIS — S01.01XA LACERATION OF SCALP, INITIAL ENCOUNTER: Primary | ICD-10-CM

## 2024-06-04 PROCEDURE — 99283 EMERGENCY DEPT VISIT LOW MDM: CPT

## 2024-06-04 PROCEDURE — 12001 RPR S/N/AX/GEN/TRNK 2.5CM/<: CPT

## 2024-06-04 ASSESSMENT — PAIN - FUNCTIONAL ASSESSMENT: PAIN_FUNCTIONAL_ASSESSMENT: PAIN ASSESSMENT IN ADVANCED DEMENTIA (PAINAD)

## 2024-06-04 ASSESSMENT — LIFESTYLE VARIABLES
HOW MANY STANDARD DRINKS CONTAINING ALCOHOL DO YOU HAVE ON A TYPICAL DAY: PATIENT DOES NOT DRINK
HOW OFTEN DO YOU HAVE A DRINK CONTAINING ALCOHOL: NEVER

## 2024-06-04 ASSESSMENT — PAIN SCALES - PAIN ASSESSMENT IN ADVANCED DEMENTIA (PAINAD)
TOTALSCORE: 1
BREATHING: NORMAL
BODYLANGUAGE: RELAXED
CONSOLABILITY: NO NEED TO CONSOLE
FACIALEXPRESSION: SAD, FRIGHTENED, FROWN

## 2024-06-05 NOTE — ED PROVIDER NOTES
EMERGENCY DEPARTMENT HISTORY AND PHYSICAL EXAM      Date: 6/4/2024  Patient Name: Hair Borden  MRN: [id number]  YOB: 1927  Date of evaluation: 6/4/2024  Provider: Stephan Shaw MD     History of Present Illness     Chief Complaint   Patient presents with    Fall       History Provided By: EMS & SABRINA Rowe    HPI: Hair Borden, 89 y.o. female with past medical history as listed and reviewed below presenting to the ED for fall.  Patient is currently on hospice at [facility].  She reportedly took a fall today.  Was sent here for laceration repair. Patient unable to provide any history.       Medical History     Past Medical History:  Past Medical History:   Diagnosis Date    Dementia (HCC)     Gastrointestinal disorder     constipation    Heart failure (HCC)     Hypertension     Psychiatric disorder     anxiety       Past Surgical History:  Past Surgical History:   Procedure Laterality Date    IR KYPHOPLASTY THORACIC FIRST LEVEL  4/14/2021    IR KYPHOPLASTY THORACIC FIRST LEVEL 4/14/2021 [facility] RAD ANGIO IR    IR KYPHOPLASTY THORACIC FIRST LEVEL  4/14/2021       Family History:  No family history on file.    Social History:  Social History     Tobacco Use    Smoking status: Unknown   Substance Use Topics    Alcohol use: Not Currently       Allergies:  Allergies   Allergen Reactions    Iodine      Other reaction(s): Unknown (comments)    Shellfish-Derived Products      Other reaction(s): Unable to Obtain       PCP: Nader Sherman MD    Current Medications:   No current facility-administered medications for this encounter.     Current Outpatient Medications   Medication Sig Dispense Refill    spironolactone (ALDACTONE) 50 MG tablet Take 1 tablet by mouth daily      acetaminophen (TYLENOL) 325 MG tablet Take 2 tablets by mouth every 6 hours as needed      aspirin 81 MG EC tablet Take 1 tablet by mouth daily      atorvastatin (LIPITOR) 10 MG tablet Take 1 tablet by mouth daily

## 2024-06-05 NOTE — ED TRIAGE NOTES
Pt arrives via EMS from the [facility][1] following a glf, blankets used to stabilize c spine as pt did not tolerate c collar. Per EMS pt did hit her head, staff denied LOC and use of thinners. Pt has laceration to back of her head, bleeding controlled.

## 2024-06-05 NOTE — ED NOTES
Pt resting in room quietly w/ no complaints at this time. Chest  rises & falls noted. Cont to wait for pt ride.

## 2024-06-05 NOTE — DISCHARGE INSTRUCTIONS
Please have staples removed in 7 days.    Thank you!    Thank you for allowing me to care for you in the emergency department.  I sincerely hope that you are satisfied with your visit today.  It is my goal to provide you with excellent care.    Below you will find a list of your labs and imaging from your visit today if applicable. Should you have any questions regarding these results please do not hesitate to call the emergency department. Please review [employer] for a more detailed result list since the below list may not be comprehensive. Instructions on how to sign up to [employer] should be provided in this packet.    Labs -   No results found for this or any previous visit (from the past 12 hour(s)).    Radiologic Studies -   No orders to display     [unfilled]  [unfilled]       If you feel that you have not received excellent quality care or timely care, please ask to speak to the nurse manager. Please choose us in the future for your continued health care needs.   ------------------------------------------------------------------------------------------------------------  The exam and treatment you received in the Emergency Department were for an urgent problem and are not intended as complete care. It is very important that you follow-up with a doctor, nurse practitioner, or physician assistant in a timely manner to:  (1) confirm your diagnosis and review all imaging and lab results,  (2) re-evaluation of changes in your illness and treatment, and  (3) for ongoing care.  If your symptoms become worse or you do not improve as expected and you are unable to reach your usual health care provider, you should return to the Emergency Department. We are available 24 hours a day.     Please take your discharge instructions with you when you go to your follow-up appointment.     If a prescription has been provided, please have it filled as soon as possible to prevent a delay in treatment. Read the entire medication instruction

## 2024-08-14 ENCOUNTER — APPOINTMENT (OUTPATIENT)
Facility: HOSPITAL | Age: 89
End: 2024-08-14
Payer: MEDICARE

## 2024-08-14 ENCOUNTER — HOSPITAL ENCOUNTER (EMERGENCY)
Facility: HOSPITAL | Age: 89
Discharge: HOME | End: 2024-08-15
Attending: EMERGENCY MEDICINE
Payer: MEDICARE

## 2024-08-14 DIAGNOSIS — S09.90XA INJURY OF HEAD, INITIAL ENCOUNTER: Primary | ICD-10-CM

## 2024-08-14 DIAGNOSIS — S02.2XXA CLOSED FRACTURE OF NASAL BONE, INITIAL ENCOUNTER: ICD-10-CM

## 2024-08-14 DIAGNOSIS — S01.81XA FACIAL LACERATION, INITIAL ENCOUNTER: ICD-10-CM

## 2024-08-14 PROCEDURE — 72125 CT NECK SPINE W/O DYE: CPT

## 2024-08-14 PROCEDURE — 70450 CT HEAD/BRAIN W/O DYE: CPT

## 2024-08-14 PROCEDURE — 99284 EMERGENCY DEPT VISIT MOD MDM: CPT

## 2024-08-14 PROCEDURE — 70486 CT MAXILLOFACIAL W/O DYE: CPT

## 2024-08-14 PROCEDURE — 94761 N-INVAS EAR/PLS OXIMETRY MLT: CPT

## 2024-08-14 ASSESSMENT — PAIN - FUNCTIONAL ASSESSMENT: PAIN_FUNCTIONAL_ASSESSMENT: 0-10

## 2024-08-14 ASSESSMENT — PAIN SCALES - GENERAL: PAINLEVEL_OUTOF10: 0

## 2024-08-15 VITALS
SYSTOLIC BLOOD PRESSURE: 91 MMHG | TEMPERATURE: 97.9 F | OXYGEN SATURATION: 96 % | HEART RATE: 82 BPM | WEIGHT: 113.3 LBS | HEIGHT: 62 IN | DIASTOLIC BLOOD PRESSURE: 54 MMHG | RESPIRATION RATE: 16 BRPM | BODY MASS INDEX: 20.85 KG/M2

## 2024-08-15 PROCEDURE — 94761 N-INVAS EAR/PLS OXIMETRY MLT: CPT

## 2024-08-15 PROCEDURE — 12011 RPR F/E/E/N/L/M 2.5 CM/<: CPT

## 2024-08-15 ASSESSMENT — PAIN SCALES - GENERAL
PAINLEVEL_OUTOF10: 0
PAINLEVEL_OUTOF10: 0

## 2024-08-15 NOTE — PROCEDURES
PROCEDURE NOTE  Date: 8/15/2024   Name: Hair Borden  YOB: 1927    Procedures    Procedure Note - Laceration Repair:  1:20 AM EDT   Procedure by MAXWELL Hardwick NP   Complexity: Simple  1.5cm linear laceration to nasal bridge was irrigated copiously with NS under jet lavage, prepped with  saline  and draped in a sterile fashion.  The wound was explored with the following results: No foreign bodies found.  The wound was repaired with Dermabond  The wound was closed with good hemostasis and approximation.  Sterile dressing applied.  Estimated blood loss: minimal  The procedure took 1-15 minutes, and pt tolerated well.

## 2024-08-15 NOTE — ED NOTES
Attempted to give report, was transferred to multiple people, eventually was transferred to memory care unit who did not answer phone, message was left with report.

## 2024-08-15 NOTE — ED PROVIDER NOTES
[facility] EMERGENCY DEPT  EMERGENCY DEPARTMENT HISTORY AND PHYSICAL EXAM      Date: 8/14/2024  Patient Name: Hair Borden  MRN: [id number]  Birthdate 8/1/1927  Date of evaluation: 8/14/2024  Provider: Shyam Rebolledo MD   Note Started: 10:41 PM EDT 8/14/24    HISTORY OF PRESENT ILLNESS     Chief Complaint   Patient presents with    Fall       History Provided By: Patient and EMS    HPI: Hair Borden is a 89 y.o. female presents for evaluation of a ground-level fall that was witnessed at her nursing home approximately 3 hours prior to arrival.  EMS states they were called for the same.  EMS was told that the patient did not have any LOC but does take 81 mg aspirin daily.  Bruising and small nasal laceration noted.  Patient denies any pain complaint at present.    PAST MEDICAL HISTORY   Past Medical History:  Past Medical History:   Diagnosis Date    Dementia (HCC)     Gastrointestinal disorder     constipation    Heart failure (HCC)     Hypertension     Psychiatric disorder     anxiety       Past Surgical History:  Past Surgical History:   Procedure Laterality Date    IR KYPHOPLASTY THORACIC FIRST LEVEL  4/14/2021    IR KYPHOPLASTY THORACIC FIRST LEVEL 4/14/2021 [facility] RAD ANGIO IR    IR KYPHOPLASTY THORACIC FIRST LEVEL  4/14/2021       Family History:  No family history on file.    Social History:  Social History     Tobacco Use    Smoking status: Unknown   Substance Use Topics    Alcohol use: Not Currently       Allergies:  Allergies   Allergen Reactions    Iodine      Other reaction(s): Unknown (comments)    Shellfish-Derived Products      Other reaction(s): Unable to Obtain       PCP: Nader Sherman MD    Current Meds:   No current facility-administered medications for this encounter.     Current Outpatient Medications   Medication Sig Dispense Refill    spironolactone (ALDACTONE) 50 MG tablet Take 1 tablet by mouth daily      acetaminophen (TYLENOL) 325 MG tablet Take 2 tablets by mouth every 6 hours as

## 2024-08-15 NOTE — ED NOTES
Patient's hospice nurse, Renetta Guerra, faxed over patient's medical hx and wants to be called when patient is discharged so she can call the facility. Hospice nurse number [phone]

## 2024-08-15 NOTE — DISCHARGE INSTRUCTIONS
Thank you for choosing our Emergency Department for your care.  It is our privilege to care for you in your time of need.  In the next several days, you may receive a survey via email or mailed to your home about your experience with our team.  We would greatly appreciate you taking a few minutes to complete the survey, as we use this information to learn what we have done well and what we could be doing better. Thank you for trusting us with your care!    Below you will find a list of your tests from today's visit.   Labs  No results found for this or any previous visit (from the past 12 hour(s)).    Radiologic Studies  CT Head W/O Contrast   Final Result   No acute intracranial abnormality. Small forehead contusion and   mildly displaced nasal fractures.      Electronically signed by Rivera Marcano      CT CSpine W/O Contrast   Final Result      1. No evidence of acute cervical spine fracture.      Electronically signed by Alexander Whitlock      CT MAXILLOFACIAL WO CONTRAST   Final Result   1. Nasal fractures.   2. Fluid in the right middle ear.      Electronically signed by Rivera Marcano        ------------------------------------------------------------------------------------------------------------  The evaluation and treatment you received in the Emergency Department were for an urgent problem. It is important that you follow-up with a doctor, nurse practitioner, or physician assistant to:  (1) confirm your diagnosis,  (2) re-evaluation of changes in your illness and treatment, and (3) for ongoing care. Please take your discharge instructions with you when you go to your follow-up appointment.     If you have any problem arranging a follow-up appointment, contact us!  If your symptoms become worse or you do not improve as expected, please return to us. We are available 24 hours a day.     If a prescription has been provided, please fill it as soon as possible to prevent a delay in treatment. If you have

## 2024-08-15 NOTE — ED TRIAGE NOTES
BIB by EMS from [facility] for witness GLF that occurred approx 2-3 hrs.  No LOC, takes ASA 81 mg daily. Lac & bruising noted to nasal bridge, bruising/redness to forehead.  Hx of dementia.  Pt very [place].

## 2024-10-24 NOTE — H&P
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 \"Yesterday was not fun.\" He also reports some increased knee pain on his contralateral side and requested to hold lateral lunges.     Objective:       Pain/Symptom Presentation:  Resting pain pre-tx: 2-3/10  Pain at worst: 4-5/10    HEP:   Access Code: [email or web address]  URL: [email or web address]  Date: 09/12/2024  Prepared by: Maricruz Mejia    Exercises  - Long Sitting Quad Set with Towel Roll Under Heel  - 1 x daily - 7 x weekly - 20 reps - 5\" hold  - Standing Quad Set  - 1 x daily - 7 x weekly - 20 reps - 5\" hold  - Heel Raises with Counter Support  - 1 x daily - 7 x weekly - 20 reps  - Heel Toe Raises with Counter Support  - 1 x daily - 7 x weekly - 20 reps  - Mini Squat with Counter Support  - 1 x daily - 7 x weekly - 2 sets - 10 reps  - Gastroc Stretch with Foot at Wall  - 1 x daily - 7 x weekly - 3 sets - 30\" hold  - Seated Hamstring Stretch  - 1 x daily - 7 x weekly - 3 sets - 30\" hold  - Seated Passive Knee Extension with Weight  - 1 x daily - 7 x weekly - 3-5 min  hold    Treatment:    Therapeutic Activities: x 6min   Step-up: 1 x 10 (L), 6\" step, focusing on TKE on lead leg  Standing split squat: 1 x 10 (B) in parallel bars     Therapeutic Exercise: x 14min   PROM - knee extension with overpressure and heel lift x 10 (L)   Quad set: 1 x 20 x 5\" (L)   Standing DLHR x 20 - focusing on TKE  TKE with ball against wall: 2 x 10 x 3\" (L)   LAQ: 2 x 10  History and Physical    Patient: Henri Christopher MRN: [id number]  SSN: [id number]    YOB: 1927  Age: 80 y.o. Sex: female      Subjective:      Henri Christopher is a 80 y.o. female who presented to the emergency room on 4/11/2021 from [address] for vague pain in her left side. Patient has a history of congestive heart failure, dementia, hypertension, anxiety, constipation. Patient is a poor historian due to the dementia. Most of the information is gathered through medical staff and chart. Per [address] patient has not had any recent falls or injuries. However over the last few days she has had decreased activity and complaining of left-sided pain. She denies any chest pain, shortness of breath, cough. CT of the head showed no acute abnormality but cortical atrophy with chronic small vessel disease. CT of the lumbar spine showed chronic compression fractures of L2 and L5, compression fractures of L3 and L4 age-indeterminate. Posterior vertebral body cortical buckling at the L4 level with severe spinal canal narrowing. CT of the thoracic spine showed multiple compression fractures at T4, T7, T8 which appear chronic. Past Medical History:   Diagnosis Date    Dementia ([facility] [address]     Gastrointestinal disorder     constipation    Heart failure ([facility] [address]     Hypertension     Psychiatric disorder     anxiety     History reviewed. No pertinent surgical history. -Unable to determine due to dementia  History reviewed. No pertinent family history. Social History     Tobacco Use    Smoking status: Unknown If Ever Smoked   Substance Use Topics    Alcohol use: Not Currently      Prior to Admission medications    Medication Sig Start Date End Date Taking? Authorizing Provider   aspirin delayed-release 81 mg tablet Take 81 mg by mouth daily. Yes Other, MD Christian   busPIRone (BUSPAR) 10 mg tablet Take 10 mg by mouth two (2) times a day.    Yes Other, MD Christian   carvediloL (COREG) 3.125 mg tablet Take (L) focusing on TKE   Elastic band lateral walk: 1 lap x 15 step (B), red theraband   Elastic band base position fwd walk: 1 lap x 15ft, red theraband     Manual Therapy: x 20min  Soft tissue mobilization (prone): (L) hamstrings, calf, posterior knee   Soft tissue mobilization (supine): (L) quads, adductors, ITB/VL  Inf patellar joint mobs: Grade 3, 4 x 10\" (L)    Assessment: Duran reports some increased pain after his last session so we discontinued lateral lunge per patient request. We continue to discuss possible progression in discharge next session and will re-assess the patient at that time.     Goals:   Short-Term Goals:  Patient will improve knee flexion AROM to 120deg to allow for reciprocal stair navigation pattern for community integration. Timeframe: 12-14 visits. (IN PROGRESS 9/18/24. Updated goal timeframe.)   The patient will improve quad strength to facilitate walking with a SP cane for daily ambulation. Timeframe: 8 visits. (MET 9/18/24)  The patient will improve LE strength and endurance to facilitate standing for extended periods of time for 2 hours daily for household and community ambulation,. Timeframe: 8 visits. (MET 9/18/24)  Patient will improve knee strength and stability to facilitate discontinued use of AD for daily ambulation. Timeframe: 12 visits. (IN PROGRESS 9/18/24)  Long-Term Goals:  Patient will improve knee extension AROM to 0deg to facilitate TKE at heel strike for normalized gait pattern. Timeframe: 16 visits. (IN PROGRESS 9/18/24. Updated goal timeframe.)   The patient will improve LE strength and endurance to facilitate walking distances of 2 mile(s) daily for household and community ambulation. Timeframe: 16 visits. (IN PROGRESS 9/18/24)  Patient will improve lower motor control to perform double-leg squat with symmetrical loading, without asymmetries, and with proper posterior chain loading to facilitate squatting and lifting ADL's. Timeframe: 16 visits. (IN PROGRESS  3.125 mg by mouth two (2) times a day. Yes Renetta, MD Christian   clopidogreL (PLAVIX) 75 mg tab Take 75 mg by mouth daily. Yes Renetta, MD Christian   gabapentin (NEURONTIN) 100 mg capsule Take 100 mg by mouth daily. Yes Renetta, MD Christian   sennosides/docusate sodium (SENNA PLUS PO) Take 1 Tab by mouth two (2) times a day. Yes Renetta, MD Christian   spironolactone (ALDACTONE) 25 mg tablet Take 25 mg by mouth daily. Yes Renetta, MD Christian   cholecalciferol (VITAMIN D3) (5000 Units/125 mcg) tab tablet Take 5,000 Units by mouth daily. Yes Christian Jackson MD   atorvastatin (LIPITOR) 10 mg tablet Take 10 mg by mouth daily. Yes Christian Jackson MD   mirtazapine (REMERON) 7.5 mg tablet Take 7.5 mg by mouth nightly. Yes Christian Jackson MD   acetaminophen (TYLENOL) 325 mg tablet Take 650 mg by mouth every six (6) hours as needed for Pain. Yes Renetta, MD Christian   polyethylene glycol (MIRALAX) 17 gram/dose powder Take 17 g by mouth daily as needed for Constipation.  Indications: constipation   Yes Renetta, MD Christian        Allergies   Allergen Reactions    Iodine Unknown (comments)    Seafood Unable to Obtain       Review of Systems:  Constitutional: No fevers, No chills, No fatigue, No weakness  Eyes: No visual disturbance  Ears, Nose, Mouth, Throat, and Face: No nasal congestion, No sore throat  Respiratory: No cough, No sputum, No wheezing, No SOB  Cardiovascular: No chest pain, No lower extremity edema, No Palpitations   Gastrointestinal: No nausea, No vomiting, No diarrhea, No constipation, No abdominal pain  Genitourinary: No frequency, No dysuria, No hematuria  Integument/Breast: No rash, No skin lesion(s), No dryness  Musculoskeletal: No arthralgias, No neck pain, No back pain  Neurological: No headaches, No dizziness, No confusion,  No seizures  Behavioral/Psychiatric: No anxiety, No depression      Objective:     Vitals:    04/11/21 0855   BP: (!) 150/50   Pulse: 68   Resp: 14   Temp: 97.9 °F (36.6 °C)   SpO2: 98%        Physical 9/18/24)  Patient will improve LE mobility, strength, and single-leg stabilization to meet strength requirements for reciprocal stair navigation pattern with no asymmetries for ascending and descending 5 flights of stairs daily for household and community ambulation. Timeframe: 20 visits. (IN PROGRESS 9/18/24)  Patient will improve LEFS score by at least 9 points to indicate a true change in improved function for ADL's and restoring PLF. Timeframe: 20 visits. (IN PROGRESS 9/18/24)    Plan: Re-assess patient for readiness for progression to discharge next session.     Charges: MT x 1, Therex x 2  Total Timed Treatment: 40min    Total Treatment Time: 40min     Exam:  General: alert, cooperative, no distress  Eye: conjunctivae/corneas clear. PERRL, EOM's intact. Throat and Neck: normal and no erythema or exudates noted. No mass   Lung: clear to auscultation bilaterally  Heart: regular rate and rhythm,   Abdomen: soft, non-tender. Bowel sounds normal. No masses,  Extremities:  able to move all extremities normal, atraumatic  Skin: Normal.  Neurologic: AOx3. Cranial nerves 2-12 and sensation grossly intact. Psychiatric: non focal    No results found for this or any previous visit (from the past 24 hour(s)). XR Results (maximum last 3): Results from Hospital Encounter encounter on 04/11/21   XR CHEST SNGL V    Narrative Examination: XR CHEST SNGL V     History: fall    Comparison: Chest radiograph 2/18/2019    FINDINGS:    Single frontal portable view of the chest. Symmetric lung volumes without focal  airspace consolidation, pneumothorax, or significant pleural effusion. Favored  punctate calcified granulomas within the right lung. The thoracic aorta is  tortuous and prominent with atherosclerotic calcification, similar to prior. The  cardiac silhouette is enlarged, also similar to prior. Fixation hardware of the  proximal left humerus. Osseous structures are overall unchanged. Impression No findings of acute cardiopulmonary abnormality. CT Results (maximum last 3): Results from [place] encounter on 04/11/21   CT SPINE [facility] WO CONT    Narrative Exam: CT SPINE [facility] WO CONT    TECHNIQUE: Multiple transaxial CT images of the thoracic spine were obtained  without contrast. Coronal and sagittal reformatted images were provided. Dose reduction: All CT scans at this facility are performed using dose reduction  optimization techniques as appropriate to a performed exam including the  following: Automated exposure control, adjustments of the mA and/or kV according  to patient size, or use of iterative reconstruction technique.     COMPARISON: Thoracic spine radiographs 6/19/2015    HISTORY: compression fracture    FINDINGS:    Compression fractures of T4, T6, T7, T8, T9, and T12. Concave endplate  deformities at T10 and T11 which may represent additional compression fractures. The compression deformities at T4, T7, and T8 appear chronic. The remainder are  age indeterminate, but new from 1. The compression fractures at T8 and T12  have posterior vertebral body cortical buckling which results in at least mild  to moderate spinal canal narrowing at these levels. Likely remote posterior left  eighth rib fracture at the costovertebral junction. There is neuroforaminal  narrowing at multiple levels within the thoracic spine, most pronounced and  severe on the left at the T9-10 level as well as on the right at T8-9. There are  other levels of significant neuroforaminal narrowing involving the T8-9 through  T10-11 levels. There is also significant neuroforaminal narrowing on the left at  the T12-L1 level. Extensive atherosclerosis of the thoracic aorta and coronary vasculature. Favored dependent atelectasis within both lungs. Respiratory motion otherwise  precludes detailed evaluation of the pulmonary parenchyma. Approximately 4 mm  right upper lobe solid nodule. Atherosclerotic calcifications within the upper  abdominal soft tissues. Impression 1. Multiple compression fractures within the thoracic spine, as detailed above. The compression fractures at T4, T7, and T8 appear chronic. The remainder  age-indeterminate but new from 2015.  2. T8 and T12 compression fractures have buckling of the posterior vertebral  body cortex which results in up to at least mild to moderate spinal canal  narrowing. 3. Multilevel neural foraminal narrowing within the thoracic spine, detailed  above. 4. Approximately 4 mm incidental right upper lobe solid nodule. Based on risk  factors for pulmonary malignancy, consider repeat chest CT in 12 months.    CT SPINE LUMB WO CONT    Narrative Exam: CT SPINE LUMB WO CONT    TECHNIQUE: Multiple transaxial CT images of the lumbar spine were obtained  without contrast. Coronal and sagittal reformatted images were provided. Dose reduction: All CT scans at this facility are performed using dose reduction  optimization techniques as appropriate to a performed exam including the  following: Automated exposure control, adjustments of the mA and/or kV according  to patient size, or use of iterative reconstruction technique. COMPARISON: Lumbar spine radiographs 6/19/2015    HISTORY: Compression fracture    FINDINGS:    5 nonrib-bearing lumbar-type vertebral bodies are present. There are chronic  compression fractures of L2 and L5. The degree of L2 height loss appears mildly  progressed. There are additional compression fractures of L4 and L3 which are  age-indeterminate, but new from 2015. Compression fracture at L4 has posterior  vertebral body cortical buckling into the spinal canal which together with  degenerative change at this level results in severe spinal canal narrowing. There is pronounced diffuse osseous demineralization. Question irregular  appearance of the sacral ala, especially on the right, which is suspicious for  an additional sacral insufficiency fracture. Multilevel degenerative lumbar spondylosis that is most pronounced in the lower  lumbar spine. Neuroforaminal narrowing is most significant and up to at least  moderate at the L4-5 level. There is also moderate neuroforaminal narrowing on  the right at L3-4. Other neuroforaminal narrowing within the lumbar spine  appears less pronounced. There is extensive atherosclerotic disease of the abdominal aorta and its major  branches. Favored extrarenal pelvis on the right without caliectasis or  hydroureter evident. Nonobstructing stone within the left renal collecting  system measuring up to approximately 4 mm. Impression 1.  Chronic compression fractures of L2 and L5.  2. Compression fractures of L3 and L4, age indeterminate, but new from 1. 3. Posterior vertebral body cortical buckling at the L4 level results in severe  spinal canal narrowing in conjunction with degenerative change at this level. 4. Diffuse osseous demineralization with sacral alar insufficiency fractures  difficult to exclude. 5. Multilevel neuroforaminal narrowing, as detailed above. 6. See above for additional comments. CT HEAD WO CONT    Narrative Dose Reduction:     All CT scans at this facility are performed using dose reduction optimization  techniques as appropriate to a performed exam including the following: Automated  exposure control, adjustments of the mA and/or kV according to patient size, or  use of iterative reconstruction technique. CT of the head without contrast. Axial images of the head were obtained  unenhanced and sagittal and coronal reconstructions were created. Comparison to  prior exam dated 03/08/2019. Previously noted changes of cortical atrophy are  again identified and white matter changes consistent with chronic small vessel  disease are again noted. Ventricles are normal in size shape and position. No  shift of the midline or mass is seen. No pathologic extra-axial fluid collection  is identified. There is no evidence of acute hemorrhage or infarction. No bony  abnormality is seen. The exam is otherwise unremarkable. Impression No acute abnormality demonstrated. Findings of cortical atrophy and  chronic small vessel disease are again noted. Assessment:   1. Multiple lumbar and thoracic compression fractures  2. Dementia without behavioral disturbances  3. History of CHF  4. Hypertension  5. Constipation    Plan:   1. We will get an MRI of the spine. Interventional radiology and orthopedics consulted. Tylenol, tramadol, morphine as needed for pain.   She normally takes aspirin and Plavix which we will hold at this time for possible kyphoplasty  2. Continue with Remeron  3. Blood pressure stable. Continue with Coreg. 4.  On stool softener  5. CBC BMP in a.m.  6.  Case management for discharge planning  7. We will consult PT and OT after orthopedic and interventional radiology evaluation    Total time: 58 min     ALISSA Sarmad Garcia - Cell [phone], home [phone] --> attempted to call, no answer and left a voicemail.      DVT prophylaxis SCDs  GI prophylaxis Pepcid    CODE STATUS DNR    Signed By: Elenora Cockayne, PA-C     April 11, 2021